# Patient Record
Sex: FEMALE | Race: WHITE | NOT HISPANIC OR LATINO | Employment: OTHER | URBAN - METROPOLITAN AREA
[De-identification: names, ages, dates, MRNs, and addresses within clinical notes are randomized per-mention and may not be internally consistent; named-entity substitution may affect disease eponyms.]

---

## 2020-04-14 ENCOUNTER — HOSPITAL ENCOUNTER (OUTPATIENT)
Dept: RADIOLOGY | Facility: MEDICAL CENTER | Age: 75
End: 2020-04-14
Payer: MEDICARE

## 2020-04-15 ENCOUNTER — OFFICE VISIT (OUTPATIENT)
Dept: PULMONOLOGY | Facility: HOSPICE | Age: 75
End: 2020-04-15
Payer: MEDICARE

## 2020-04-15 VITALS
HEIGHT: 65 IN | BODY MASS INDEX: 22.99 KG/M2 | WEIGHT: 138 LBS | TEMPERATURE: 97.9 F | HEART RATE: 87 BPM | DIASTOLIC BLOOD PRESSURE: 66 MMHG | OXYGEN SATURATION: 95 % | SYSTOLIC BLOOD PRESSURE: 124 MMHG | RESPIRATION RATE: 16 BRPM

## 2020-04-15 DIAGNOSIS — R60.9 EDEMA, UNSPECIFIED TYPE: ICD-10-CM

## 2020-04-15 DIAGNOSIS — R91.8 PULMONARY NODULES: ICD-10-CM

## 2020-04-15 PROCEDURE — 99204 OFFICE O/P NEW MOD 45 MIN: CPT | Performed by: INTERNAL MEDICINE

## 2020-04-15 RX ORDER — ESCITALOPRAM OXALATE 10 MG/1
TABLET ORAL
COMMUNITY
Start: 2020-01-28

## 2020-04-15 RX ORDER — LEVOTHYROXINE SODIUM 112 UG/1
88 TABLET ORAL
COMMUNITY
Start: 2020-04-02

## 2020-04-15 RX ORDER — LIOTHYRONINE SODIUM 5 UG/1
TABLET ORAL
COMMUNITY
Start: 2020-02-27 | End: 2022-10-14

## 2020-04-15 ASSESSMENT — ENCOUNTER SYMPTOMS
EYE REDNESS: 0
HEADACHES: 0
HEARTBURN: 0
SPUTUM PRODUCTION: 0
HEMOPTYSIS: 0
MYALGIAS: 0
PHOTOPHOBIA: 0
DOUBLE VISION: 0
FOCAL WEAKNESS: 0
SORE THROAT: 0
SINUS PAIN: 0
CHILLS: 0
DIZZINESS: 0
NECK PAIN: 0
EYE PAIN: 0
ORTHOPNEA: 0
WEAKNESS: 0
WEIGHT LOSS: 0
NAUSEA: 0
DEPRESSION: 0
BACK PAIN: 0
CONSTIPATION: 0
BLURRED VISION: 0
WHEEZING: 0
SHORTNESS OF BREATH: 0
PALPITATIONS: 0
DIARRHEA: 0
PND: 0
CLAUDICATION: 0
ABDOMINAL PAIN: 0
EYE DISCHARGE: 0
DIAPHORESIS: 0
COUGH: 0
SPEECH CHANGE: 0
TREMORS: 0
FEVER: 0
VOMITING: 0
STRIDOR: 0
FALLS: 0

## 2020-04-15 NOTE — PROGRESS NOTES
Chief Complaint   Patient presents with   • Establish Care     referral 2/19/2020 [facility]    • Results     [address] Thorax 3/12/2020, CXR 2/8/2020       HPI: This patient is a 74 y.o. female presenting for evaluation of pulmonary nodules.  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  The patient is a lifelong non-smoker.  She has no known occupational exposures.  No family history of autoimmune, atopic or cancer related illness.  She does have a  who is currently in remission from 9 smoking-related lung cancer.  She was seen in the emergency department in March for bilateral lower extremity edema and ultrasound was done which showed no evidence of DVT.  She also had a CAT scan of the chest done with contrast which demonstrated a 3 mm left lower lobe nodule and a 5 mm right middle lobe nodule.  Some atelectasis in the bilateral bases but no parenchymal lung disease, emphysema or lymphadenopathy.  The patient denies any pulmonary symptoms such as shortness of breath, dyspnea on exertion, chest pain, cough.  No infectious symptoms such as fever, chill, night sweats, weight loss.  She presents today mainly due to concern given her 's history and desire to follow-up.    Past Medical History:   Diagnosis Date   • Arthritis    • Cerebral palsy (HCC)    • Dental disorder     peridontal disease   • Migraine    • Pain     right hip,right shoulder,pain scale 7   • Psychiatric problem     anxiety and depression   • Snoring    • Transfusion history    • Unspecified disorder of thyroid     hypothyroid       Social History     Socioeconomic History   • Marital status:      Spouse name: Not on file   • Number of children: Not on file   • Years of education: Not on file   • Highest education level: Not on file   Occupational History   • Not on file   Social Needs   • Financial resource strain: Not on  file   • Food insecurity     Worry: Not on file     Inability: Not on file   • Transportation needs     Medical: Not on file     Non-medical: Not on file   Tobacco Use   • Smoking status: Never Smoker   • Smokeless tobacco: Never Used   Substance and Sexual Activity   • Alcohol use: No   • Drug use: No   • Sexual activity: Not on file   Lifestyle   • Physical activity     Days per week: Not on file     Minutes per session: Not on file   • Stress: Not on file   Relationships   • Social connections     Talks on phone: Not on file     Gets together: Not on file     Attends Yarsanism service: Not on file     Active member of club or organization: Not on file     Attends meetings of clubs or organizations: Not on file     Relationship status: Not on file   • Intimate partner violence     Fear of current or ex partner: Not on file     Emotionally abused: Not on file     Physically abused: Not on file     Forced sexual activity: Not on file   Other Topics Concern   • Not on file   Social History Narrative   • Not on file       Family History   Problem Relation Age of Onset   • Alzheimer's Disease Father        Current Outpatient Medications on File Prior to Visit   Medication Sig Dispense Refill   • escitalopram (LEXAPRO) 10 MG Tab      • liothyronine (CYTOMEL) 5 MCG Tab      • levothyroxine (SYNTHROID) 112 MCG Tab Take 112 mcg by mouth every day.     • Calcium-Magnesium-Vitamin D (GÓMEZ-MAG COMPLEX PO) Take 1,000 mg by mouth 3 times a day. Indications: 1000mg/500mg     • Cholecalciferol (VITAMIN D-3) 5000 units Tab Take  by mouth every day.     • Cobalamine Combinations (VITAMIN B12-FOLIC ACID PO) Take 1,000 mcg by mouth every day.     • thyroid (NP THYROID) 90 MG Tab Take 45 mg by mouth every day. Takes 1/2 tablet of 90 mg tablet daily. Confirmed with [facility] pharmacy     • hydrocodone-acetaminophen (NORCO) 7.5-325 MG per tablet Take 1 Tab by mouth every four hours as needed for Mild Pain.       No current  "facility-administered medications on file prior to visit.        Allergies: Bee venom and Shellfish allergy    ROS:   Review of Systems   Constitutional: Negative for chills, diaphoresis, fever, malaise/fatigue and weight loss.   HENT: Negative for congestion, ear discharge, ear pain, hearing loss, nosebleeds, sinus pain, sore throat and tinnitus.    Eyes: Negative for blurred vision, double vision, photophobia, pain, discharge and redness.   Respiratory: Negative for cough, hemoptysis, sputum production, shortness of breath, wheezing and stridor.    Cardiovascular: Positive for leg swelling. Negative for chest pain, palpitations, orthopnea, claudication and PND.   Gastrointestinal: Negative for abdominal pain, constipation, diarrhea, heartburn, nausea and vomiting.   Genitourinary: Negative for dysuria and urgency.   Musculoskeletal: Negative for back pain, falls, joint pain, myalgias and neck pain.   Skin: Negative for itching and rash.   Neurological: Negative for dizziness, tremors, speech change, focal weakness, weakness and headaches.   Endo/Heme/Allergies: Negative for environmental allergies.   Psychiatric/Behavioral: Negative for depression.       /66 (BP Location: Left arm, Patient Position: Sitting, BP Cuff Size: Adult)   Pulse 87   Temp 36.6 °C (97.9 °F) (Temporal)   Resp 16   Ht 1.651 m (5' 5\")   Wt 62.6 kg (138 lb)   SpO2 95%     Physical Exam:  Physical Exam  Constitutional:       General: She is not in acute distress.     Appearance: Normal appearance. She is well-developed and normal weight.   HENT:      Head: Normocephalic and atraumatic.      Right Ear: External ear normal.      Left Ear: External ear normal.      Nose: Nose normal. No congestion.      Mouth/Throat:      Mouth: Mucous membranes are moist.      Pharynx: Oropharynx is clear. No oropharyngeal exudate.   Eyes:      General: No scleral icterus.     Extraocular Movements: Extraocular movements intact.      " Conjunctiva/sclera: Conjunctivae normal.      Pupils: Pupils are equal, round, and reactive to light.   Neck:      Musculoskeletal: Neck supple.      Vascular: No JVD.      Trachea: No tracheal deviation.   Cardiovascular:      Rate and Rhythm: Normal rate and regular rhythm.      Heart sounds: Normal heart sounds. No murmur. No friction rub. No gallop.    Pulmonary:      Effort: Pulmonary effort is normal. No accessory muscle usage or respiratory distress.      Breath sounds: Normal breath sounds. No wheezing or rales.   Abdominal:      General: There is no distension.      Palpations: Abdomen is soft.      Tenderness: There is no abdominal tenderness.   Musculoskeletal: Normal range of motion.         General: Deformity present. No tenderness.      Right lower leg: Edema present.      Left lower leg: Edema present.      Comments: Trace b/l pedal edema, deformity of RUE   Lymphadenopathy:      Cervical: No cervical adenopathy.   Skin:     General: Skin is warm and dry.      Findings: No rash.      Nails: There is no clubbing.     Neurological:      Mental Status: She is alert and oriented to person, place, and time.      Cranial Nerves: No cranial nerve deficit.      Gait: Gait normal.   Psychiatric:         Mood and Affect: Mood normal.         Behavior: Behavior normal.         PFTs as reviewed by me personally:none    Imaging as reviewed by me personally:as per hPI    Assessment:  1. Edema, unspecified type  EC-ECHOCARDIOGRAM COMPLETE W/O CONT   2. Pulmonary nodules  CT-CHEST (THORAX) W/O       Plan:  1.  There is some chronicity to this per patient which has changed recently as her swelling was previously responsive to decreasing salt in her diet.  More recently it does not resolve as easily as previously and there is some tenderness associated with it.  I am recommending echocardiogram and follow-up with primary care.  2.  2 small, subcentimeter pulmonary nodules in low risk patient.  At this point optional  follow-up at 1 year would be recommended however given her concern with her 's non-smoking related, lung cancer we discussed a follow-up CT chest in 6 months and if no changes then in 1 year and essentially follow her nodules for a total of 2 years to ensure stability.  Patient is okay with this plan.  She will follow-up in 6 months with CT chest.  She will contact me sooner if any symptoms develop.  Return in about 6 months (around 10/15/2020) for ct, echo.

## 2020-10-01 ENCOUNTER — TELEPHONE (OUTPATIENT)
Dept: PULMONOLOGY | Facility: HOSPICE | Age: 75
End: 2020-10-01

## 2020-10-01 NOTE — TELEPHONE ENCOUNTER
Received a call from He at [facility]. They are requesting the order from Dr Bynum for CT of the chest.      Order was re-printed and faxed to them as requested.    Confirmation scanned in the patient's account.

## 2020-10-15 ENCOUNTER — OFFICE VISIT (OUTPATIENT)
Dept: PULMONOLOGY | Facility: HOSPICE | Age: 75
End: 2020-10-15
Payer: MEDICARE

## 2020-10-15 VITALS
BODY MASS INDEX: 20.97 KG/M2 | HEART RATE: 78 BPM | TEMPERATURE: 98.2 F | WEIGHT: 126 LBS | SYSTOLIC BLOOD PRESSURE: 114 MMHG | RESPIRATION RATE: 16 BRPM | DIASTOLIC BLOOD PRESSURE: 60 MMHG | OXYGEN SATURATION: 97 %

## 2020-10-15 DIAGNOSIS — R60.9 EDEMA, UNSPECIFIED TYPE: ICD-10-CM

## 2020-10-15 DIAGNOSIS — R91.8 PULMONARY NODULES: ICD-10-CM

## 2020-10-15 PROCEDURE — 99214 OFFICE O/P EST MOD 30 MIN: CPT | Performed by: INTERNAL MEDICINE

## 2020-10-15 ASSESSMENT — ENCOUNTER SYMPTOMS
HEADACHES: 0
FOCAL WEAKNESS: 0
SORE THROAT: 0
MYALGIAS: 0
ABDOMINAL PAIN: 0
CLAUDICATION: 0
SINUS PAIN: 0
FALLS: 0
BACK PAIN: 0
WEIGHT LOSS: 0
SHORTNESS OF BREATH: 0
DEPRESSION: 0
EYE PAIN: 0
EYE REDNESS: 0
CHILLS: 0
DOUBLE VISION: 0
TREMORS: 0
NAUSEA: 0
PHOTOPHOBIA: 0
DIAPHORESIS: 0
PND: 0
BLURRED VISION: 0
DIZZINESS: 0
SPUTUM PRODUCTION: 0
WEAKNESS: 0
PALPITATIONS: 0
WHEEZING: 0
STRIDOR: 0
SPEECH CHANGE: 0
HEMOPTYSIS: 0
DIARRHEA: 0
CONSTIPATION: 0
NECK PAIN: 0
HEARTBURN: 0
VOMITING: 0
COUGH: 0
EYE DISCHARGE: 0
FEVER: 0
ORTHOPNEA: 0

## 2020-10-15 NOTE — PROGRESS NOTES
Chief Complaint   Patient presents with   • Follow-Up     last seen 4/15/2020    • Results     Ct 10/2/2020, Echo 5/14/2020          HPI: This patient is a 75 y.o. female whom is followed in our clinic for pulmonary nodules last seen by me on 4/15/20.   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  The patient is a lifelong non-smoker.  She has no known occupational exposures.  No family history of autoimmune, atopic or cancer related illness.  She does have a  who is currently in remission from a non-smoking-related lung cancer.  She was seen in the emergency department in March for bilateral lower extremity edema and ultrasound was done which showed no evidence of DVT.  She also had a CAT scan of the chest done with contrast which demonstrated a 3 mm left lower lobe nodule and a 5 mm right middle lobe nodule.  Some atelectasis in the bilateral bases but no parenchymal lung disease, emphysema or lymphadenopathy.    We had plan to obtain echocardiogram and follow-up CT chest in 6 months.  She presents today for routine follow-up.  CT from October 2 shows stable 3 mm nodule and less conspicuous 5 mm nodule with no new nodules and no lymphadenopathy.  No parenchymal lung disease.  Echocardiogram from May 14 showed normal LV and RV function, no significant valvular pathology or pulmonary hypertension.  Patient has no new pulmonary complaints.  She continues to have unilateral left lower extremity edema but is trying to ambulate more to promote increased blood flow.  She sees her primary care later today as it does cause her some discomfort.    Past Medical History:   Diagnosis Date   • Arthritis    • Cerebral palsy (HCC)    • Dental disorder     peridontal disease   • Migraine    • Pain     right hip,right shoulder,pain scale 7   • Psychiatric problem     anxiety and depression   • Snoring    • Transfusion history    •  Unspecified disorder of thyroid     hypothyroid       Social History     Socioeconomic History   • Marital status:      Spouse name: Not on file   • Number of children: Not on file   • Years of education: Not on file   • Highest education level: Not on file   Occupational History   • Not on file   Social Needs   • Financial resource strain: Not on file   • Food insecurity     Worry: Not on file     Inability: Not on file   • Transportation needs     Medical: Not on file     Non-medical: Not on file   Tobacco Use   • Smoking status: Never Smoker   • Smokeless tobacco: Never Used   Substance and Sexual Activity   • Alcohol use: No   • Drug use: No   • Sexual activity: Not on file   Lifestyle   • Physical activity     Days per week: Not on file     Minutes per session: Not on file   • Stress: Not on file   Relationships   • Social connections     Talks on phone: Not on file     Gets together: Not on file     Attends [employer] service: Not on file     Active member of club or organization: Not on file     Attends meetings of clubs or organizations: Not on file     Relationship status: Not on file   • Intimate partner violence     Fear of current or ex partner: Not on file     Emotionally abused: Not on file     Physically abused: Not on file     Forced sexual activity: Not on file   Other Topics Concern   • Not on file   Social History Narrative   • Not on file       Family History   Problem Relation Age of Onset   • Alzheimer's Disease Father        Current Outpatient Medications on File Prior to Visit   Medication Sig Dispense Refill   • escitalopram (LEXAPRO) 10 MG Tab      • liothyronine (CYTOMEL) 5 MCG Tab      • levothyroxine (SYNTHROID) 112 MCG Tab Take 112 mcg by mouth every day.     • Calcium-Magnesium-Vitamin D (GÓMEZ-MAG COMPLEX PO) Take 1,000 mg by mouth 3 times a day. Indications: 1000mg/500mg     • Cholecalciferol (VITAMIN D-3) 5000 units Tab Take  by mouth every day.     • Cobalamine Combinations  (VITAMIN B12-FOLIC ACID PO) Take 1,000 mcg by mouth every day.     • hydrocodone-acetaminophen (NORCO) 7.5-325 MG per tablet Take 1 Tab by mouth every four hours as needed for Mild Pain.     • thyroid (NP THYROID) 90 MG Tab Take 45 mg by mouth every day. Takes 1/2 tablet of 90 mg tablet daily. Confirmed with [facility] pharmacy       No current facility-administered medications on file prior to visit.        Bee venom and Shellfish allergy      ROS:   Review of Systems   Constitutional: Negative for chills, diaphoresis, fever, malaise/fatigue and weight loss.   HENT: Negative for congestion, ear discharge, ear pain, hearing loss, nosebleeds, sinus pain, sore throat and tinnitus.    Eyes: Negative for blurred vision, double vision, photophobia, pain, discharge and redness.   Respiratory: Negative for cough, hemoptysis, sputum production, shortness of breath, wheezing and stridor.    Cardiovascular: Positive for leg swelling. Negative for chest pain, palpitations, orthopnea, claudication and PND.   Gastrointestinal: Negative for abdominal pain, constipation, diarrhea, heartburn, nausea and vomiting.   Genitourinary: Negative for dysuria and urgency.   Musculoskeletal: Negative for back pain, falls, joint pain, myalgias and neck pain.   Skin: Negative for itching and rash.   Neurological: Negative for dizziness, tremors, speech change, focal weakness, weakness and headaches.   Endo/Heme/Allergies: Negative for environmental allergies.   Psychiatric/Behavioral: Negative for depression.       /60 (BP Location: Left arm, Patient Position: Sitting, BP Cuff Size: Large adult)   Pulse 78   Temp 36.8 °C (98.2 °F) (Temporal)   Resp 16   Wt 57.2 kg (126 lb)   SpO2 97%   Physical Exam  Constitutional:       General: She is not in acute distress.     Appearance: Normal appearance. She is well-developed and normal weight.   HENT:      Head: Normocephalic and atraumatic.      Right Ear: External ear normal.      Left Ear:  External ear normal.      Nose: Nose normal. No congestion.      Mouth/Throat:      Mouth: Mucous membranes are moist.      Pharynx: Oropharynx is clear. No oropharyngeal exudate.   Eyes:      General: No scleral icterus.     Extraocular Movements: Extraocular movements intact.      Conjunctiva/sclera: Conjunctivae normal.      Pupils: Pupils are equal, round, and reactive to light.   Neck:      Musculoskeletal: Normal range of motion and neck supple.      Vascular: No JVD.      Trachea: No tracheal deviation.   Cardiovascular:      Rate and Rhythm: Normal rate and regular rhythm.      Heart sounds: Normal heart sounds. No murmur. No friction rub. No gallop.    Pulmonary:      Effort: Pulmonary effort is normal. No accessory muscle usage or respiratory distress.      Breath sounds: Normal breath sounds. No wheezing or rales.   Abdominal:      General: There is no distension.      Palpations: Abdomen is soft.      Tenderness: There is no abdominal tenderness.   Musculoskeletal: Normal range of motion.         General: Deformity present. No tenderness.      Right lower leg: No edema.      Left lower leg: Edema present.      Comments: Mild deformity of RUE   Lymphadenopathy:      Cervical: No cervical adenopathy.   Skin:     General: Skin is warm and dry.      Findings: No rash.      Nails: There is no clubbing.     Neurological:      Mental Status: She is alert and oriented to person, place, and time.      Cranial Nerves: No cranial nerve deficit.      Gait: Gait normal.   Psychiatric:         Mood and Affect: Mood normal.         Behavior: Behavior normal.         PFTs as reviewed by me personally:none    Imaging as reviewed by me personally:  As per hPI    Assessment:  1. Pulmonary nodules  CT-CHEST (THORAX) W/O   2. Edema, unspecified type         Plan:  1.  3 mm nodule is not concerning.  5 mm nodule less conspicuous and likely benign.  Patient is low risk, lifelong non-smoker.  Given nodules greater than 4 mm but  less than 6 mm we opted to repeat CT chest in 1 year or sooner if symptoms develop.  2.  This is chronic, mild and I suspect related to venous insufficiency.  She has follow-up with PCP regarding this today.  I encouraged her to discuss possible compression stocking.  Return in about 1 year (around 10/15/2021) for CT chest.

## 2021-09-24 ENCOUNTER — HOSPITAL ENCOUNTER (OUTPATIENT)
Dept: RADIOLOGY | Facility: MEDICAL CENTER | Age: 76
End: 2021-09-24
Attending: INTERNAL MEDICINE
Payer: MEDICARE

## 2021-09-24 DIAGNOSIS — R91.8 PULMONARY NODULES: ICD-10-CM

## 2021-09-24 PROCEDURE — 71250 CT THORAX DX C-: CPT | Mod: MG

## 2021-10-15 ENCOUNTER — OFFICE VISIT (OUTPATIENT)
Dept: SLEEP MEDICINE | Facility: MEDICAL CENTER | Age: 76
End: 2021-10-15
Payer: MEDICARE

## 2021-10-15 VITALS
DIASTOLIC BLOOD PRESSURE: 64 MMHG | WEIGHT: 140 LBS | HEART RATE: 84 BPM | RESPIRATION RATE: 16 BRPM | TEMPERATURE: 98.4 F | BODY MASS INDEX: 23.32 KG/M2 | HEIGHT: 65 IN | SYSTOLIC BLOOD PRESSURE: 130 MMHG | OXYGEN SATURATION: 96 %

## 2021-10-15 DIAGNOSIS — R91.8 LUNG NODULES: ICD-10-CM

## 2021-10-15 PROCEDURE — 99213 OFFICE O/P EST LOW 20 MIN: CPT | Performed by: INTERNAL MEDICINE

## 2021-10-15 ASSESSMENT — ENCOUNTER SYMPTOMS
SPEECH CHANGE: 0
HEADACHES: 0
DOUBLE VISION: 0
DIAPHORESIS: 0
HEARTBURN: 0
STRIDOR: 0
PALPITATIONS: 0
WEIGHT LOSS: 0
DEPRESSION: 0
EYE REDNESS: 0
NECK PAIN: 0
SHORTNESS OF BREATH: 0
CONSTIPATION: 0
DIZZINESS: 0
BLURRED VISION: 0
ABDOMINAL PAIN: 0
FOCAL WEAKNESS: 0
FEVER: 0
SORE THROAT: 0
SINUS PAIN: 0
PHOTOPHOBIA: 0
CHILLS: 0
ORTHOPNEA: 0
FALLS: 0
WHEEZING: 0
CLAUDICATION: 0
MYALGIAS: 0
COUGH: 0
EYE DISCHARGE: 0
EYE PAIN: 0
WEAKNESS: 0
SPUTUM PRODUCTION: 0
DIARRHEA: 0
HEMOPTYSIS: 0
NAUSEA: 0
PND: 0
TREMORS: 0
VOMITING: 0
BACK PAIN: 0

## 2021-10-15 NOTE — PROGRESS NOTES
Chief Complaint   Patient presents with   • Follow-Up     last seen 10/15/2020   • Results     CT Chest Thorax 9/24/21          HPI: This patient is a 76 y.o. female whom is followed in our clinic for pulmonary nodules last seen by me on 10/15/20.  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  The patient is a lifelong non-smoker.  She has no known occupational exposures.  No family history of autoimmune, atopic or cancer related illness.  She does have a  who is currently in remission from a non-smoking-related lung cancer.  She was seen in the emergency department in March 2020 for bilateral lower extremity edema and ultrasound was done which showed no evidence of DVT.  She also had a CAT scan of the chest done with contrast which demonstrated a 3 mm left lower lobe nodule and a 5 mm right middle lobe nodule.  Some atelectasis in the bilateral bases but no parenchymal lung disease, emphysema or lymphadenopathy.   Echo showed normal LV and RV fx in May 2020. Surveillance CT from October last year showed no change in pulmonary nodules and pt has remained asx form a pulmonary standpoint. She presents today for routine f/u with CT from 9/24 showing stable nodules and two new punctate RUL pulmonary nodules. One per my read may be in an airway? Pt remains w/o pulmonary sxs. No cough, no fever/chills/nt sweats/wt loss.    Past Medical History:   Diagnosis Date   • Arthritis    • Cerebral palsy (HCC)    • Dental disorder     peridontal disease   • Migraine    • Pain     right hip,right shoulder,pain scale 7   • Psychiatric problem     anxiety and depression   • Snoring    • Transfusion history    • Unspecified disorder of thyroid     hypothyroid       Social History     Socioeconomic History   • Marital status:      Spouse name: Not on file   • Number of children: Not on file   • Years of education: Not on file   •  Highest education level: Not on file   Occupational History   • Not on file   Tobacco Use   • Smoking status: Never Smoker   • Smokeless tobacco: Never Used   Vaping Use   • Vaping Use: Never used   Substance and Sexual Activity   • Alcohol use: No   • Drug use: No   • Sexual activity: Not on file   Other Topics Concern   • Not on file   Social History Narrative   • Not on file     Social Determinants of Health     Financial Resource Strain:    • Difficulty of Paying Living Expenses:    Food Insecurity:    • Worried About Running Out of Food in the Last Year:    • Ran Out of Food in the Last Year:    Transportation Needs:    • Lack of Transportation (Medical):    • Lack of Transportation (Non-Medical):    Physical Activity:    • Days of Exercise per Week:    • Minutes of Exercise per Session:    Stress:    • Feeling of Stress :    Social Connections:    • Frequency of Communication with Friends and Family:    • Frequency of Social Gatherings with Friends and Family:    • Attends [employer] Services:    • Active Member of Clubs or Organizations:    • Attends Club or Organization Meetings:    • Marital Status:    Intimate Partner Violence:    • Fear of Current or Ex-Partner:    • Emotionally Abused:    • Physically Abused:    • Sexually Abused:        Family History   Problem Relation Age of Onset   • Alzheimer's Disease Father        Current Outpatient Medications on File Prior to Visit   Medication Sig Dispense Refill   • Multiple Vitamins-Minerals (ZINC PO) Take  by mouth.     • escitalopram (LEXAPRO) 10 MG Tab      • liothyronine (CYTOMEL) 5 MCG Tab      • levothyroxine (SYNTHROID) 112 MCG Tab Take 88 mcg by mouth every day.     • thyroid (NP THYROID) 90 MG Tab Take 45 mg by mouth every day. Takes 1/2 tablet of 90 mg tablet daily. Confirmed with [facility] pharmacy     • Calcium-Magnesium-Vitamin D (GÓMEZ-MAG COMPLEX PO) Take 1,000 mg by mouth 3 times a day. Indications: 1000mg/500mg     • Cholecalciferol (VITAMIN D-3) 5000  "units Tab Take  by mouth every day.     • Cobalamine Combinations (VITAMIN B12-FOLIC ACID PO) Take 1,000 mcg by mouth every day.     • hydrocodone-acetaminophen (NORCO) 7.5-325 MG per tablet Take 1 Tab by mouth every four hours as needed for Mild Pain.       No current facility-administered medications on file prior to visit.       Bee venom and Shellfish allergy      ROS:   Review of Systems   Constitutional: Negative for chills, diaphoresis, fever, malaise/fatigue and weight loss.   HENT: Negative for congestion, ear discharge, ear pain, hearing loss, nosebleeds, sinus pain, sore throat and tinnitus.    Eyes: Negative for blurred vision, double vision, photophobia, pain, discharge and redness.   Respiratory: Negative for cough, hemoptysis, sputum production, shortness of breath, wheezing and stridor.    Cardiovascular: Negative for chest pain, palpitations, orthopnea, claudication, leg swelling and PND.   Gastrointestinal: Negative for abdominal pain, constipation, diarrhea, heartburn, nausea and vomiting.   Genitourinary: Negative for dysuria and urgency.   Musculoskeletal: Negative for back pain, falls, joint pain, myalgias and neck pain.   Skin: Negative for itching and rash.   Neurological: Negative for dizziness, tremors, speech change, focal weakness, weakness and headaches.   Endo/Heme/Allergies: Negative for environmental allergies.   Psychiatric/Behavioral: Negative for depression.       /64 (BP Location: Left arm, Patient Position: Sitting, BP Cuff Size: Adult)   Pulse 84   Temp 36.9 °C (98.4 °F) (Temporal)   Resp 16   Ht 1.651 m (5' 5\")   Wt 63.5 kg (140 lb)   SpO2 96%   Physical Exam  Constitutional:       General: She is not in acute distress.     Appearance: Normal appearance. She is well-developed and normal weight.   HENT:      Head: Normocephalic and atraumatic.      Right Ear: External ear normal.      Left Ear: External ear normal.      Nose: Nose normal. No congestion.      " Mouth/Throat:      Mouth: Mucous membranes are moist.      Pharynx: Oropharynx is clear. No oropharyngeal exudate.   Eyes:      General: No scleral icterus.     Extraocular Movements: Extraocular movements intact.      Conjunctiva/sclera: Conjunctivae normal.      Pupils: Pupils are equal, round, and reactive to light.   Neck:      Vascular: No JVD.      Trachea: No tracheal deviation.   Cardiovascular:      Rate and Rhythm: Normal rate and regular rhythm.      Heart sounds: Normal heart sounds. No murmur heard.   No friction rub. No gallop.    Pulmonary:      Effort: Pulmonary effort is normal. No accessory muscle usage or respiratory distress.      Breath sounds: Normal breath sounds. No wheezing or rales.   Abdominal:      General: There is no distension.      Palpations: Abdomen is soft.      Tenderness: There is no abdominal tenderness.   Musculoskeletal:         General: No tenderness or deformity. Normal range of motion.      Cervical back: Normal range of motion and neck supple.      Right lower leg: No edema.      Left lower leg: No edema.   Lymphadenopathy:      Cervical: No cervical adenopathy.   Skin:     General: Skin is warm and dry.      Findings: No rash.      Nails: There is no clubbing.   Neurological:      Mental Status: She is alert and oriented to person, place, and time.      Cranial Nerves: No cranial nerve deficit.      Gait: Gait normal.   Psychiatric:         Mood and Affect: Mood normal.         Behavior: Behavior normal.         PFTs as reviewed by me personally: as per hPI    Imaging as reviewed by me personally:  As per hPI    Assessment:  1. Lung nodules  CT-CHEST (THORAX) W/O       Plan:  The largest of these are stable now since 3/2020. Pt is low risk but now with several new nodules. Repeat CT in one year. If no change and no sxs, can likely stop surveillance imaging.   Return in about 1 year (around 10/15/2022) for ct chest.

## 2022-10-14 ENCOUNTER — HOSPITAL ENCOUNTER (OUTPATIENT)
Dept: RADIOLOGY | Facility: MEDICAL CENTER | Age: 77
End: 2022-10-14

## 2022-10-14 ENCOUNTER — OFFICE VISIT (OUTPATIENT)
Dept: SLEEP MEDICINE | Facility: MEDICAL CENTER | Age: 77
End: 2022-10-14
Payer: MEDICARE

## 2022-10-14 VITALS
DIASTOLIC BLOOD PRESSURE: 60 MMHG | RESPIRATION RATE: 16 BRPM | OXYGEN SATURATION: 97 % | HEIGHT: 65 IN | HEART RATE: 64 BPM | BODY MASS INDEX: 23.49 KG/M2 | WEIGHT: 141 LBS | SYSTOLIC BLOOD PRESSURE: 122 MMHG

## 2022-10-14 DIAGNOSIS — R91.8 LUNG NODULES: ICD-10-CM

## 2022-10-14 DIAGNOSIS — Z86.16 HISTORY OF COVID-19: ICD-10-CM

## 2022-10-14 PROCEDURE — 99213 OFFICE O/P EST LOW 20 MIN: CPT | Performed by: INTERNAL MEDICINE

## 2022-10-14 ASSESSMENT — ENCOUNTER SYMPTOMS
CONSTIPATION: 0
FALLS: 0
WEIGHT LOSS: 0
NAUSEA: 0
EYE DISCHARGE: 0
HEADACHES: 0
DIZZINESS: 0
SPEECH CHANGE: 0
SINUS PAIN: 0
WHEEZING: 0
SHORTNESS OF BREATH: 0
BLURRED VISION: 0
MYALGIAS: 0
EYE PAIN: 0
SORE THROAT: 0
DEPRESSION: 0
EYE REDNESS: 0
CHILLS: 0
NECK PAIN: 0
DIARRHEA: 0
DOUBLE VISION: 0
FEVER: 0
ORTHOPNEA: 0
CLAUDICATION: 0
VOMITING: 0
ABDOMINAL PAIN: 0
HEMOPTYSIS: 0
FOCAL WEAKNESS: 0
WEAKNESS: 0
PHOTOPHOBIA: 0
TREMORS: 0
STRIDOR: 0
PALPITATIONS: 0
DIAPHORESIS: 0
PND: 0
BACK PAIN: 0
HEARTBURN: 0
COUGH: 0
SPUTUM PRODUCTION: 0

## 2022-10-14 NOTE — PROGRESS NOTES
Chief Complaint   Patient presents with    Follow-Up     Last seen 10/15/21     Results     CT Chest Thorax 10/11/22          HPI: This patient is a 77 y.o. female whom is followed in our clinic for pulmonary nodules last seen by me on 10/15/21. 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  The patient is a lifelong non-smoker.  She has no known occupational exposures.  No family history of autoimmune, atopic or cancer related illness.  She does have a  who is currently in remission from a non-smoking-related lung cancer.  She was seen in the emergency department in March 2020 for bilateral lower extremity edema and ultrasound was done which showed no evidence of DVT.  She also had a CAT scan of the chest done with contrast which demonstrated a 3 mm left lower lobe nodule and a 5 mm right middle lobe nodule.  Some atelectasis in the bilateral bases but no parenchymal lung disease, emphysema or lymphadenopathy.   Echo showed normal LV and RV fx in May 2020. Surveillance CT from October 2020 and again September 2021 showed stability of largest nodules however the CT from 9/2021 showed some new, punctate RUL nodules. We planned to repeat one additional CT which was done at [facility] on 10/11 and the punctate nodules had resolved, largest RUL nodule slightly decreased in size. No new nodules. Pt did have covid in February but has since recovered. No respiratory complaints today.    Past Medical History:   Diagnosis Date    Arthritis     Cerebral palsy (HCC)     Dental disorder     peridontal disease    Migraine     Pain     right hip,right shoulder,pain scale 7    Psychiatric problem     anxiety and depression    Snoring     Transfusion history     Unspecified disorder of thyroid     hypothyroid       Social History     Socioeconomic History    Marital status:      Spouse name: Not on file    Number of children:  Not on file    Years of education: Not on file    Highest education level: Not on file   Occupational History    Not on file   Tobacco Use    Smoking status: Never    Smokeless tobacco: Never   Vaping Use    Vaping Use: Never used   Substance and Sexual Activity    Alcohol use: No    Drug use: No    Sexual activity: Not on file   Other Topics Concern    Not on file   Social History Narrative    Not on file     Social Determinants of Health     Financial Resource Strain: Not on file   Food Insecurity: Not on file   Transportation Needs: Not on file   Physical Activity: Not on file   Stress: Not on file   Social Connections: Not on file   Intimate Partner Violence: Not on file   Housing Stability: Not on file       Family History   Problem Relation Age of Onset    Alzheimer's Disease Father        Current Outpatient Medications on File Prior to Visit   Medication Sig Dispense Refill    Multiple Vitamins-Minerals (ZINC PO) Take  by mouth.      escitalopram (LEXAPRO) 10 MG Tab       levothyroxine (SYNTHROID) 112 MCG Tab Take 88 mcg by mouth every day.      Calcium-Magnesium-Vitamin D (GÓMEZ-MAG COMPLEX PO) Take 1,000 mg by mouth 3 times a day. Indications: 1000mg/500mg      Cholecalciferol (VITAMIN D-3) 5000 units Tab Take  by mouth every day.      Cobalamine Combinations (VITAMIN B12-FOLIC ACID PO) Take 1,000 mcg by mouth every day.      HYDROcodone-acetaminophen (NORCO) 7.5-325 MG tab Take 1 Tab by mouth every four hours as needed for Mild Pain.      liothyronine (CYTOMEL) 5 MCG Tab  (Patient not taking: Reported on 10/14/2022)      thyroid (ARMOUR THYROID) 90 MG Tab Take 45 mg by mouth every day. Takes 1/2 tablet of 90 mg tablet daily. Confirmed with [facility] pharmacy (Patient not taking: Reported on 10/14/2022)       No current facility-administered medications on file prior to visit.       Bee venom and Shellfish allergy      ROS:   Review of Systems   Constitutional:  Negative for chills, diaphoresis, fever,  "malaise/fatigue and weight loss.   HENT:  Negative for congestion, ear discharge, ear pain, hearing loss, nosebleeds, sinus pain, sore throat and tinnitus.    Eyes:  Negative for blurred vision, double vision, photophobia, pain, discharge and redness.   Respiratory:  Negative for cough, hemoptysis, sputum production, shortness of breath, wheezing and stridor.    Cardiovascular:  Negative for chest pain, palpitations, orthopnea, claudication, leg swelling and PND.   Gastrointestinal:  Negative for abdominal pain, constipation, diarrhea, heartburn, nausea and vomiting.   Genitourinary:  Negative for dysuria and urgency.   Musculoskeletal:  Negative for back pain, falls, joint pain, myalgias and neck pain.   Skin:  Negative for itching and rash.   Neurological:  Negative for dizziness, tremors, speech change, focal weakness, weakness and headaches.   Endo/Heme/Allergies:  Negative for environmental allergies.   Psychiatric/Behavioral:  Negative for depression.      /60 (BP Location: Left arm, Patient Position: Sitting, BP Cuff Size: Adult)   Pulse 64   Resp 16   Ht 1.651 m (5' 5\")   Wt 64 kg (141 lb)   SpO2 97%   Physical Exam  Constitutional:       General: She is not in acute distress.     Appearance: Normal appearance. She is well-developed and normal weight.   HENT:      Head: Normocephalic and atraumatic.      Right Ear: External ear normal.      Left Ear: External ear normal.      Nose: Nose normal. No congestion.      Mouth/Throat:      Mouth: Mucous membranes are moist.      Pharynx: Oropharynx is clear. No oropharyngeal exudate.   Eyes:      General: No scleral icterus.     Extraocular Movements: Extraocular movements intact.      Conjunctiva/sclera: Conjunctivae normal.      Pupils: Pupils are equal, round, and reactive to light.   Neck:      Vascular: No JVD.      Trachea: No tracheal deviation.   Cardiovascular:      Rate and Rhythm: Normal rate and regular rhythm.      Heart sounds: Normal heart " sounds. No murmur heard.    No friction rub. No gallop.   Pulmonary:      Effort: Pulmonary effort is normal. No accessory muscle usage or respiratory distress.      Breath sounds: Normal breath sounds. No wheezing or rales.   Abdominal:      General: There is no distension.      Palpations: Abdomen is soft.      Tenderness: There is no abdominal tenderness.   Musculoskeletal:         General: No tenderness or deformity. Normal range of motion.      Cervical back: Normal range of motion and neck supple.      Right lower leg: No edema.      Left lower leg: No edema.   Lymphadenopathy:      Cervical: No cervical adenopathy.   Skin:     General: Skin is warm and dry.      Findings: No rash.      Nails: There is no clubbing.   Neurological:      Mental Status: She is alert and oriented to person, place, and time.      Cranial Nerves: No cranial nerve deficit.      Gait: Gait normal.   Psychiatric:         Behavior: Behavior normal.       PFTs as reviewed by me personally: NA    Imaging as reviewed by me personally:  as per hPI    Assessment:  1. Lung nodules        2. History of COVID-19            Plan:  Smaller newer nodules resolved on most recent CT and largest nodule is stable to slightly decreased in size for over 2 years now. Okay to stop surveillance imaging in low risk patient  Pt has since recovered radiographically and clinically. Continue best supportive care  Return if symptoms worsen or fail to improve.

## 2024-09-02 ENCOUNTER — APPOINTMENT (OUTPATIENT)
Dept: RADIOLOGY | Facility: MEDICAL CENTER | Age: 79
DRG: 084 | End: 2024-09-02
Attending: EMERGENCY MEDICINE
Payer: MEDICARE

## 2024-09-02 ENCOUNTER — HOSPITAL ENCOUNTER (OUTPATIENT)
Dept: RADIOLOGY | Facility: MEDICAL CENTER | Age: 79
End: 2024-09-02

## 2024-09-02 ENCOUNTER — HOSPITAL ENCOUNTER (INPATIENT)
Facility: MEDICAL CENTER | Age: 79
LOS: 2 days | DRG: 084 | End: 2024-09-04
Attending: EMERGENCY MEDICINE | Admitting: STUDENT IN AN ORGANIZED HEALTH CARE EDUCATION/TRAINING PROGRAM
Payer: MEDICARE

## 2024-09-02 DIAGNOSIS — G80.9 CEREBRAL PALSY, UNSPECIFIED TYPE (HCC): ICD-10-CM

## 2024-09-02 DIAGNOSIS — E03.9 ACQUIRED HYPOTHYROIDISM: ICD-10-CM

## 2024-09-02 DIAGNOSIS — Z91.89 AT RISK FOR MALNUTRITION: ICD-10-CM

## 2024-09-02 DIAGNOSIS — I60.9 SAH (SUBARACHNOID HEMORRHAGE) (HCC): ICD-10-CM

## 2024-09-02 DIAGNOSIS — I60.9 SUBARACHNOID HEMORRHAGE (HCC): ICD-10-CM

## 2024-09-02 DIAGNOSIS — S09.90XA CLOSED HEAD INJURY, INITIAL ENCOUNTER: ICD-10-CM

## 2024-09-02 DIAGNOSIS — R55 SYNCOPE AND COLLAPSE: ICD-10-CM

## 2024-09-02 PROBLEM — Z71.89 ACP (ADVANCE CARE PLANNING): Status: ACTIVE | Noted: 2024-09-02

## 2024-09-02 LAB
APPEARANCE UR: CLEAR
BILIRUB UR QL STRIP.AUTO: NEGATIVE
CFT BLD TEG: 5.6 MIN (ref 4.6–9.1)
CFT P HPASE BLD TEG: 4.7 MIN (ref 4.3–8.3)
CLOT ANGLE BLD TEG: 76 DEGREES (ref 63–78)
COLOR UR: YELLOW
CT.EXTRINSIC BLD ROTEM: 0.9 MIN (ref 0.8–2.1)
EKG IMPRESSION: NORMAL
GLUCOSE UR STRIP.AUTO-MCNC: NEGATIVE MG/DL
KETONES UR STRIP.AUTO-MCNC: NEGATIVE MG/DL
LEUKOCYTE ESTERASE UR QL STRIP.AUTO: NEGATIVE
MCF BLD TEG: 65.1 MM (ref 52–69)
MCF.PLATELET INHIB BLD ROTEM: 24.5 MM (ref 15–32)
MICRO URNS: NORMAL
NITRITE UR QL STRIP.AUTO: NEGATIVE
PA AA BLD-ACNC: 0 % (ref 0–11)
PA ADP BLD-ACNC: 12.6 % (ref 0–17)
PH UR STRIP.AUTO: 6.5 [PH] (ref 5–8)
PROT UR QL STRIP: NEGATIVE MG/DL
RBC UR QL AUTO: NEGATIVE
SP GR UR STRIP.AUTO: 1.02
TEG ALGORITHM TGALG: NORMAL
UROBILINOGEN UR STRIP.AUTO-MCNC: 0.2 MG/DL

## 2024-09-02 PROCEDURE — 73030 X-RAY EXAM OF SHOULDER: CPT | Mod: RT

## 2024-09-02 PROCEDURE — 99223 1ST HOSP IP/OBS HIGH 75: CPT | Mod: 25 | Performed by: STUDENT IN AN ORGANIZED HEALTH CARE EDUCATION/TRAINING PROGRAM

## 2024-09-02 PROCEDURE — 93005 ELECTROCARDIOGRAM TRACING: CPT | Performed by: STUDENT IN AN ORGANIZED HEALTH CARE EDUCATION/TRAINING PROGRAM

## 2024-09-02 PROCEDURE — 700105 HCHG RX REV CODE 258: Performed by: STUDENT IN AN ORGANIZED HEALTH CARE EDUCATION/TRAINING PROGRAM

## 2024-09-02 PROCEDURE — 99222 1ST HOSP IP/OBS MODERATE 55: CPT | Performed by: STUDENT IN AN ORGANIZED HEALTH CARE EDUCATION/TRAINING PROGRAM

## 2024-09-02 PROCEDURE — 700102 HCHG RX REV CODE 250 W/ 637 OVERRIDE(OP): Performed by: STUDENT IN AN ORGANIZED HEALTH CARE EDUCATION/TRAINING PROGRAM

## 2024-09-02 PROCEDURE — 85384 FIBRINOGEN ACTIVITY: CPT | Mod: 91

## 2024-09-02 PROCEDURE — 36415 COLL VENOUS BLD VENIPUNCTURE: CPT

## 2024-09-02 PROCEDURE — 85347 COAGULATION TIME ACTIVATED: CPT

## 2024-09-02 PROCEDURE — 99497 ADVNCD CARE PLAN 30 MIN: CPT | Mod: 25 | Performed by: STUDENT IN AN ORGANIZED HEALTH CARE EDUCATION/TRAINING PROGRAM

## 2024-09-02 PROCEDURE — 770020 HCHG ROOM/CARE - TELE (206)

## 2024-09-02 PROCEDURE — 305948 HCHG GREEN TRAUMA ACT PRE-NOTIFY NO CC

## 2024-09-02 PROCEDURE — 81003 URINALYSIS AUTO W/O SCOPE: CPT

## 2024-09-02 PROCEDURE — A9270 NON-COVERED ITEM OR SERVICE: HCPCS | Performed by: STUDENT IN AN ORGANIZED HEALTH CARE EDUCATION/TRAINING PROGRAM

## 2024-09-02 PROCEDURE — 72125 CT NECK SPINE W/O DYE: CPT

## 2024-09-02 PROCEDURE — 71045 X-RAY EXAM CHEST 1 VIEW: CPT

## 2024-09-02 PROCEDURE — 99285 EMERGENCY DEPT VISIT HI MDM: CPT

## 2024-09-02 PROCEDURE — 85576 BLOOD PLATELET AGGREGATION: CPT | Mod: 91

## 2024-09-02 PROCEDURE — G0316 PR PROLONGED IP/OBS E&M EA 15 MIN: HCPCS | Performed by: STUDENT IN AN ORGANIZED HEALTH CARE EDUCATION/TRAINING PROGRAM

## 2024-09-02 RX ORDER — CHLORAL HYDRATE 500 MG
1000 CAPSULE ORAL
COMMUNITY

## 2024-09-02 RX ORDER — OXYBUTYNIN CHLORIDE 10 MG/1
10 TABLET, EXTENDED RELEASE ORAL
COMMUNITY

## 2024-09-02 RX ORDER — LEVOTHYROXINE SODIUM 88 UG/1
88 TABLET ORAL
Status: DISCONTINUED | OUTPATIENT
Start: 2024-09-03 | End: 2024-09-04 | Stop reason: HOSPADM

## 2024-09-02 RX ORDER — LIOTHYRONINE SODIUM 5 UG/1
2.5 TABLET ORAL EVERY MORNING
COMMUNITY
Start: 2024-03-25

## 2024-09-02 RX ORDER — SODIUM CHLORIDE 9 MG/ML
INJECTION, SOLUTION INTRAVENOUS CONTINUOUS
Status: DISCONTINUED | OUTPATIENT
Start: 2024-09-02 | End: 2024-09-03

## 2024-09-02 RX ORDER — LIOTHYRONINE SODIUM 5 UG/1
2.5 TABLET ORAL EVERY MORNING
Status: DISCONTINUED | OUTPATIENT
Start: 2024-09-03 | End: 2024-09-04 | Stop reason: HOSPADM

## 2024-09-02 RX ORDER — ACETAMINOPHEN 325 MG/1
650 TABLET ORAL EVERY 6 HOURS PRN
Status: DISCONTINUED | OUTPATIENT
Start: 2024-09-02 | End: 2024-09-04 | Stop reason: HOSPADM

## 2024-09-02 RX ADMIN — ACETAMINOPHEN 650 MG: 325 TABLET ORAL at 20:45

## 2024-09-02 RX ADMIN — SODIUM CHLORIDE: 9 INJECTION, SOLUTION INTRAVENOUS at 18:28

## 2024-09-02 ASSESSMENT — ENCOUNTER SYMPTOMS
RESPIRATORY NEGATIVE: 1
EYES NEGATIVE: 1
CONSTITUTIONAL NEGATIVE: 1
LOSS OF CONSCIOUSNESS: 1
CARDIOVASCULAR NEGATIVE: 1
PSYCHIATRIC NEGATIVE: 1
MUSCULOSKELETAL NEGATIVE: 1
GASTROINTESTINAL NEGATIVE: 1

## 2024-09-02 NOTE — H&P
Consult time 1622  Eval time 1626  CHIEF COMPLAINT: Fall.     HISTORY OF PRESENT ILLNESS: The patient is a 78 year-old White woman who was injured in a fall. The patient experienced a possible syncopal event and ground-level fall. She had an unknown loss of consciousness. The patient denies any chronic anticoagulation or antiplatelet medications. She complains of pain right shoulder.    TRIAGE CATEGORY: The patient was triaged as a Trauma Green Transfer Activation. The patient was initially evaluated at [facility] in [place], [place] where CT imaging demonstrated questionable SAH. She was transported to [facility] in [place], [place] for a Trauma Surgery trauma evaluation. An expeditious primary and secondary survey with required adjuncts was conducted. See Trauma Narrator for full details.    PAST MEDICAL HISTORY:  has a past medical history of Arthritis, Cerebral palsy (HCC), Dental disorder, Migraine, Pain, Psychiatric problem, Snoring, Transfusion history, and Unspecified disorder of thyroid.    PAST SURGICAL HISTORY:  has a past surgical history that includes tonsillectomy (1956); other (1968); hip arthroplasty total (10/1/2009); tenotomy (10/1/2009); and shoulder arthroplasty total reversed (Right, 9/20/2018).    ALLERGIES:   Allergies   Allergen Reactions    Bee Venom Swelling     anaphylaxis    Shellfish Allergy Swelling       CURRENT MEDICATIONS:   Home Medications    **Home medications have not yet been reviewed for this encounter**       Audit from Redirected Encounters    **Home medications have not yet been reviewed for this encounter**       FAMILY HISTORY: family history includes Alzheimer's Disease in her father.    SOCIAL HISTORY:  reports that she has never smoked. She has never used smokeless tobacco. She reports that she does not drink alcohol and does not use drugs.    REVIEW OF SYSTEMS: Review of systems is remarkable for the following right shoulder pain.  "The remainder of the comprehensive ROS is negative with the exception of the aforementioned HPI, PMH, and PSH bullets in accordance with CMS guideline.    PHYSICAL EXAMINATION:      Vital Signs: /56   Pulse 63   Temp 37.2 °C (99 °F) (Temporal)   Resp 14   Ht 1.626 m (5' 4\")   Wt 64 kg (141 lb)   SpO2 94%   Physical Exam  Constitutional:       General: She is not in acute distress.     Appearance: Normal appearance. She is normal weight. She is not ill-appearing.   HENT:      Head: Normocephalic and atraumatic.      Right Ear: External ear normal.      Left Ear: External ear normal.      Nose: Nose normal.      Mouth/Throat:      Mouth: Mucous membranes are moist.      Pharynx: Oropharynx is clear.   Eyes:      Extraocular Movements: Extraocular movements intact.      Pupils: Pupils are equal, round, and reactive to light.   Cardiovascular:      Rate and Rhythm: Normal rate and regular rhythm.      Pulses: Normal pulses.   Pulmonary:      Effort: Pulmonary effort is normal.      Breath sounds: Normal breath sounds.   Abdominal:      General: Abdomen is flat. There is no distension.      Palpations: Abdomen is soft.      Tenderness: There is no abdominal tenderness.   Musculoskeletal:         General: Tenderness (Right shoulder tenderness) present. No swelling.      Cervical back: Normal range of motion and neck supple. No tenderness.   Skin:     General: Skin is warm and dry.      Capillary Refill: Capillary refill takes less than 2 seconds.   Neurological:      Mental Status: She is alert.      Comments: History of cerebral palsy.  Chronic contractures of the right hand/wrist and ankles.  Strength is 5/5, intact sensation   Psychiatric:         Mood and Affect: Mood normal.         LABORATORY VALUES:   Recent Labs     09/02/24  1352   RBC 4.75   HEMOGLOBIN 13.7   HEMATOCRIT 40.5   MCV 85.2   MCH 29.0   MCHC 34.0   RDW 13.4   PLATELETCT 318   MPV 7.6                    IMAGING:   DX-CHEST-LIMITED (1 VIEW) "   Final Result      No evidence of acute cardiopulmonary process.      DX-SHOULDER 2+ RIGHT   Final Result      Post right shoulder arthroplasty. No evidence of fracture or dislocation of the arthroplasty components.      OUTSIDE IMAGES-CT HEAD   Final Result      CT-CSPINE WITHOUT PLUS RECONS    (Results Pending)       ASSESSMENT AND PLAN:     78 year old female with ground level fall   Mechanism unclear, possible syncopal event  Seen at [facility] with CT calling questionable trace SAH  She denies taking ASA or any other antiplatelet/anticoagulation, she is therefore BIG1 criteria  Given her cerebral palsy and possible syncopal episodes recommend admission to medical service for syncopal workup  TBI poses minimal risk and a repeat head CT or neurosurgical consultation  She needs a repeat exam by the trauma surgery service overnight at again in the AM  Trauma surgery will continue to follow and perform teriary survey in AM         ____________________________________     Nicola Mueller M.D.    DD: 9/2/2024  4:26 PM

## 2024-09-02 NOTE — ED PROVIDER NOTES
Emergency Physician Note    Chief Concern:  No chief complaint on file.        External Records Reviewed:  [facility] Everywhere: [facility] Everywhere was utilized to review the emergency department record from [facility] emergency department.  Patient was seen earlier today, emergency physician note reviewed from that visit.  She has a past medical history significant for cerebral palsy, and presented for evaluation after a fall.She does not remember the event, and is noted of a past medical history significant for cerebral palsy.  She reported pain localized to the right shoulder.  CTA of the head and neck demonstrates left temporal subarachnoid hemorrhage.  As [facility] does not have neurosurgical services, patient was transferred to [facility] for neurosurgical consultation.     HPI/ROS     Limitation to History:  Patient does not recall the fall, did have associated loss of consciousness     Outside Historians:   1.  Transporting paramedics provide additional history     HPI:  Letha Barbour is a 78 y.o. female who presents to the emergency department today as a transfer from [facility] emergency department for evaluation of a left temporal subarachnoid hemorrhage.  She had a fall earlier today while going out to her garden, uncertain if this fall was mechanical due to loss of balance, or may have been due to syncope.  She does not have any seizure history.  As she does not recall the event, history is limited.  On arrival to the emergency department she has some pain localized to the right shoulder, she is able to range to light right shoulder though range of motion is limited by pain as well as residual weakness due to history of cerebral palsy.  She has no severe headache, no nausea, no vomiting.  She is not currently on any anticoagulation antiplatelet agents.  She has not had any new or worsening symptoms, GCS is 15 on arrival to the emergency department with no new focal  deficits, though she does have residual right sided weakness due to history of cerebral palsy.    PAST MEDICAL HISTORY  Past Medical History:   Diagnosis Date    Arthritis     Cerebral palsy (HCC)     Dental disorder     peridontal disease    Migraine     Pain     right hip,right shoulder,pain scale 7    Psychiatric problem     anxiety and depression    Snoring     Transfusion history     Unspecified disorder of thyroid     hypothyroid       SURGICAL HISTORY  Past Surgical History:   Procedure Laterality Date    SHOULDER ARTHROPLASTY TOTAL REVERSED Right 9/20/2018    Procedure: RT SHOULDER ARTHROPLASTY TOTAL REVERSE;  Surgeon: Mariano Ballesteros M.D.;  Location: SURGERY [facility];  Service: Orthopedics    HIP ARTHROPLASTY TOTAL  10/1/2009    Performed by JAIME BRYANT at SURGERY [facility] ORS    TENOTOMY  10/1/2009    Performed by JAIME BRYANT at SURGERY [facility] ORS    OTHER  1968    right foot tendon lengthening     TONSILLECTOMY  1956       FAMILY HISTORY  Family History   Problem Relation Age of Onset    Alzheimer's Disease Father        SOCIAL HISTORY   reports that she has never smoked. She has never used smokeless tobacco. She reports that she does not drink alcohol and does not use drugs.    CURRENT MEDICATIONS  Previous Medications    CALCIUM-MAGNESIUM-VITAMIN D (GÓMEZ-MAG COMPLEX PO)    Take 1,000 mg by mouth 3 times a day. Indications: 1000mg/500mg    CHOLECALCIFEROL (VITAMIN D-3) 5000 UNITS TAB    Take  by mouth every day.    COBALAMINE COMBINATIONS (VITAMIN B12-FOLIC ACID PO)    Take 1,000 mcg by mouth every day.    ESCITALOPRAM (LEXAPRO) 10 MG TAB        HYDROCODONE-ACETAMINOPHEN (NORCO) 7.5-325 MG TAB    Take 1 Tab by mouth every four hours as needed for Mild Pain.    LEVOTHYROXINE (SYNTHROID) 112 MCG TAB    Take 88 mcg by mouth every day.    MULTIPLE VITAMINS-MINERALS (ZINC PO)    Take  by mouth.       ALLERGIES  Bee venom and Shellfish allergy    PHYSICAL EXAM  Vital Signs: BP  "119/67   Pulse 63   Temp 37.2 °C (99 °F) (Temporal)   Resp 17   Ht 1.626 m (5' 4\")   Wt 64 kg (141 lb)   SpO2 95%   BMI 24.20 kg/m²   Constitutional: Awake, alert, afebrile  HENT: No evidence of facial trauma, pupils 3 mm, equally round and reactive  Neck: Cervical spine nontender to palpation  Cardiovascular: No tachycardia, regular rate and rhythm  Pulmonary: No respiratory distress, normal work of breathing, breath sounds quiet and equal bilaterally  Abdomen: Soft, non tender, no discomfort on abdominal palpation  Skin: Warm, dry, no rashes or lesions, minor abrasion to the right shoulder  Musculoskeletal: Decreased range of motion to the right upper and right lower extremity which patient reports is her baseline due to history of cerebral palsy, muscle atrophy noted, no obvious deformity to the right shoulder  Neurologic: Awake and alert, no slurred speech, no evidence of active altered mental status    Diagnostic Studies & Procedures    Labs:  All labs reviewed by me as noted below.    EK Lead EKG interpreted by me as noted below  Results for orders placed or performed during the hospital encounter of 24   EKG   Result Value Ref Range    Report       [facility] Emergency Dept.    Test Date:  2024  Pt Name:    TERARNCE RANDOLPH               Department: ER  MRN:        [id number]                      Room:        20  Gender:     Female                       Technician: [id number]  :        [id number]                   Requested By:ROME CHRISTIAN  Order #:    [id number]                    Reading MD: LULY BRAVO MD    Measurements  Intervals                                Axis  Rate:       64                           P:          64  UT:         163                          QRS:        68  QRSD:       95                           T:          17  QT:         441  QTc:        455    Interpretive Statements  Rate 64, sinus rhythm, low voltage throughout all leads, no " prior available  for comparison.  Electronically Signed On 09- 21:50:00 PDT by LULY BRAVO MD         Radiology:  The attending Emergency Physician has independently interpreted the following imaging:  I independently interpreted the chest x-ray in the trauma bay, no pneumothorax identified, no displaced rib fractures identified.    CT-CSPINE WITHOUT PLUS RECONS   Final Result         1. No acute fracture from C1 through T1 is visualized.         DX-CHEST-LIMITED (1 VIEW)   Final Result      No evidence of acute cardiopulmonary process.      DX-SHOULDER 2+ RIGHT   Final Result      Post right shoulder arthroplasty. No evidence of fracture or dislocation of the arthroplasty components.      OUTSIDE IMAGES-CT HEAD   Final Result          Course and Medical Decision Making    Initial Assessment and Plan:  Ms. Barbour presents to the emergency department today for neurosurgical consultation due to traumatic subarachnoid hemorrhage diagnosed at outlying facility.  On arrival to the emergency department she shows no evidence of neurologic compromise.  No new focal deficits, though history of right sided residual weakness.  She has no headache, no nausea, no vomiting.     Paramedics did not report any anticoagulation or antiplatelet agent use.  RN noticed 81 mg aspirin dose on prior note from José Keys, patient confirms that she is not currently taking aspirin.  Platelet mapping with tag was ordered, that resulted normal without inhibition.    I discussed her presentation with Dr. Mueller, trauma critical care surgeon, trauma team will follow the patient during hospitalization for monitoring of subarachnoid hemorrhage.  Plan this time is for overnight monitoring given age, as well as uncertainty of the mechanism surrounding her fall.  Believe she would benefit from monitoring to evaluate for any evidence of hypotension, or syncopal symptoms prior to discharge.    Additional Problems and Disposition    I have  discussed management of the patient with the following physicians:   Dr. Pinzon, Radiologist, regarding adequacy of transferred imaging with regard to evaluation for C-spine fracture.  Dr. Mueller, Trauma Critical Care Surgeon  Dr. Mantilla, Hospitalist    Escalation of care considered, and ultimately not performed:  1.  Neurosurgical consultation -neurosurgical consultation considered, however patient needs brain injury guideline 1 criteria, no indication for emergent neurosurgical consultation at this time.    Disposition:  Admit in stable condition    FINAL IMPRESSION   1. Closed head injury, initial encounter    2. Subarachnoid hemorrhage (HCC)

## 2024-09-02 NOTE — ED TRIAGE NOTES
Bib idalmis fire from [facility] transfer for SAH  Right shoulder pain 5/10  A/o x 4  Fall while gardening today

## 2024-09-02 NOTE — ED NOTES
Instructed to send glucose logs in 7 days.  Reach out to me sooner for any glucose <70 or consistently >200.    Hypoglycemia (Low Blood Glucose)  Low blood glucose occurs with the following conditions.  · Not Enough Food or Missing Meal.  · Too Much Insulin  · More Exercise Than Usual    It is best to use something that you can always carry with you. Choose a food that is all carbohydrate because it will be very fast acting. Try not to choose chocolate or other high fat foods. They will not work fast enough and you may also end up over-treating your lows. The suggested amount of carbohydrate to start with is 15 grams. Don't keep eating until you feel better. Eat the required amount and stop. The feelings will pass and you will be grateful that you did not overdo it.    Some people with diabetes know when their blood glucose is low and some do not. If you are a person who is not aware of hypoglycemia, it is important to test your blood glucose more often. Everyone with diabetes should test before driving a car to assure safety on the road. Blood glucose should be above 100 mg/dl before driving and at bedtime.     The symptoms of low blood sugars are usually heart racing, sweating, anxiety, feeling hungry, tremor, weakness or most severely loss of consciousness.     Rule of 15:    Test your blood sugar   If glucose is between 50-70 mg/dL then ingest 15 grams of fast-acting carbs   If glucose is less than 50 mg/dL then ingest 30 grams of fast-acting carbs   Ingest 15 grams of fast-acting carbohydrate - such as:   a. 3-4 glucose tablets  b. 4 oz juice  c. ½ can regular soda pop  d. 15 skittles or mini jelly beans    Re-check your blood sugar in 15 minutes. If its less than 70mg/dl, repeat steps 2 and 3.   If your next meal is more than 1 hour away, eat an additional 15 grams of carbohydrate and 1 ounce of protein (examples include crackers with cheese or one-half of a sandwich with peanut butter). It is important not  Taking belongings and  pt to room 20 from lobby, xrays done,awaiting orders for more imaging.    to eat too much because this can raise your blood sugar above the target level.    After your blood sugar has normalized, think about why you went low. If you notice a pattern of low blood sugars, contact your Diabetes Team. We may need to adjust your medication.

## 2024-09-03 PROBLEM — R55 SYNCOPE AND COLLAPSE: Status: ACTIVE | Noted: 2024-09-03

## 2024-09-03 LAB
ANION GAP SERPL CALC-SCNC: 11 MMOL/L (ref 7–16)
BUN SERPL-MCNC: 14 MG/DL (ref 8–22)
CALCIUM SERPL-MCNC: 8.4 MG/DL (ref 8.5–10.5)
CHLORIDE SERPL-SCNC: 111 MMOL/L (ref 96–112)
CO2 SERPL-SCNC: 20 MMOL/L (ref 20–33)
CREAT SERPL-MCNC: 0.62 MG/DL (ref 0.5–1.4)
ERYTHROCYTE [DISTWIDTH] IN BLOOD BY AUTOMATED COUNT: 41.1 FL (ref 35.9–50)
GFR SERPLBLD CREATININE-BSD FMLA CKD-EPI: 92 ML/MIN/1.73 M 2
GLUCOSE SERPL-MCNC: 100 MG/DL (ref 65–99)
HCT VFR BLD AUTO: 36.1 % (ref 37–47)
HGB BLD-MCNC: 12.3 G/DL (ref 12–16)
MCH RBC QN AUTO: 29.8 PG (ref 27–33)
MCHC RBC AUTO-ENTMCNC: 34.1 G/DL (ref 32.2–35.5)
MCV RBC AUTO: 87.4 FL (ref 81.4–97.8)
PLATELET # BLD AUTO: 262 K/UL (ref 164–446)
PMV BLD AUTO: 9.4 FL (ref 9–12.9)
POTASSIUM SERPL-SCNC: 3.5 MMOL/L (ref 3.6–5.5)
RBC # BLD AUTO: 4.13 M/UL (ref 4.2–5.4)
SODIUM SERPL-SCNC: 142 MMOL/L (ref 135–145)
WBC # BLD AUTO: 8.7 K/UL (ref 4.8–10.8)

## 2024-09-03 PROCEDURE — 770020 HCHG ROOM/CARE - TELE (206)

## 2024-09-03 PROCEDURE — A9270 NON-COVERED ITEM OR SERVICE: HCPCS | Performed by: STUDENT IN AN ORGANIZED HEALTH CARE EDUCATION/TRAINING PROGRAM

## 2024-09-03 PROCEDURE — 700102 HCHG RX REV CODE 250 W/ 637 OVERRIDE(OP): Performed by: STUDENT IN AN ORGANIZED HEALTH CARE EDUCATION/TRAINING PROGRAM

## 2024-09-03 PROCEDURE — 700101 HCHG RX REV CODE 250: Performed by: STUDENT IN AN ORGANIZED HEALTH CARE EDUCATION/TRAINING PROGRAM

## 2024-09-03 PROCEDURE — 85027 COMPLETE CBC AUTOMATED: CPT

## 2024-09-03 PROCEDURE — 700105 HCHG RX REV CODE 258: Performed by: STUDENT IN AN ORGANIZED HEALTH CARE EDUCATION/TRAINING PROGRAM

## 2024-09-03 PROCEDURE — 99999 PR NO CHARGE: CPT | Mod: DUPCHRG | Performed by: NURSE PRACTITIONER

## 2024-09-03 PROCEDURE — 80048 BASIC METABOLIC PNL TOTAL CA: CPT

## 2024-09-03 PROCEDURE — 99232 SBSQ HOSP IP/OBS MODERATE 35: CPT | Performed by: STUDENT IN AN ORGANIZED HEALTH CARE EDUCATION/TRAINING PROGRAM

## 2024-09-03 RX ORDER — TRAMADOL HYDROCHLORIDE 50 MG/1
50 TABLET ORAL EVERY 6 HOURS PRN
Status: DISCONTINUED | OUTPATIENT
Start: 2024-09-03 | End: 2024-09-04 | Stop reason: HOSPADM

## 2024-09-03 RX ORDER — LIDOCAINE 4 G/G
1 PATCH TOPICAL EVERY 24 HOURS
Status: DISCONTINUED | OUTPATIENT
Start: 2024-09-03 | End: 2024-09-04 | Stop reason: HOSPADM

## 2024-09-03 RX ADMIN — LEVOTHYROXINE SODIUM 88 MCG: 0.09 TABLET ORAL at 06:42

## 2024-09-03 RX ADMIN — LIOTHYRONINE SODIUM 2.5 MCG: 5 TABLET ORAL at 06:42

## 2024-09-03 RX ADMIN — ACETAMINOPHEN 650 MG: 325 TABLET ORAL at 15:03

## 2024-09-03 RX ADMIN — SODIUM CHLORIDE: 9 INJECTION, SOLUTION INTRAVENOUS at 04:51

## 2024-09-03 RX ADMIN — LIDOCAINE 1 PATCH: 4 PATCH TOPICAL at 09:00

## 2024-09-03 ASSESSMENT — ENCOUNTER SYMPTOMS
RESPIRATORY NEGATIVE: 1
PSYCHIATRIC NEGATIVE: 1
HEMOPTYSIS: 0
DIZZINESS: 0
CARDIOVASCULAR NEGATIVE: 1
SHORTNESS OF BREATH: 0
HEADACHES: 1
EYES NEGATIVE: 1
GASTROINTESTINAL NEGATIVE: 1
CONSTITUTIONAL NEGATIVE: 1

## 2024-09-03 ASSESSMENT — PAIN DESCRIPTION - PAIN TYPE
TYPE: ACUTE PAIN

## 2024-09-03 NOTE — PROGRESS NOTES
4 Eyes Skin Assessment Completed by OWEN Nascimento and OWEN García.    Head Bruising, R Temp  Ears WDL  Nose WDL  Mouth WDL  Neck WDL  Breast/Chest WDL  Shoulder Blades Redness and Blanching, Abraision/bruise on Right shoulder  Spine WDL  (R) Arm/Elbow/Hand Bruising and Abrasion Elbow  (L) Arm/Elbow/Hand WDL  Abdomen WDL  Groin WDL  Scrotum/Coccyx/Buttocks WDL  (R) Leg WDL  (L) Leg WDL  (R) Heel/Foot/Toe Redness and Blanching on shin  (L) Heel/Foot/Toe Discoloration, Calluses          Devices In Places Tele Box and Pulse Ox      Interventions In Place Pillows    Possible Skin Injury No    Pictures Uploaded Into Epic Yes  Wound Consult Placed N/A  RN Wound Prevention Protocol Ordered No

## 2024-09-03 NOTE — ASSESSMENT & PLAN NOTE
CT at outside hospital showing left temporal subarachnoid hemorrhage.    Trauma surgery consulted and s/o, no intervention warranted  Neurochecks q4h, repeat CTH if interval neurologic deficit  Avoid aspirin/NSAIDs/anticoagulant medication

## 2024-09-03 NOTE — ED NOTES
Pt medicated per MAR , pt tolerated , Pt resting on gurney, pt attached to monitor, respirations are equal and unlabored, call light in reach , bed locked and in lowest position, no needs at this time

## 2024-09-03 NOTE — PROGRESS NOTES
"      Mental status adequate for full examination?: Yes    Spine cleared (radiologically and/or clinically): Yes    REVIEW OF SYSTEMS:  Review of Systems   Constitutional: Negative.    HENT: Negative.     Eyes: Negative.    Respiratory: Negative.     Cardiovascular: Negative.    Gastrointestinal: Negative.    Genitourinary: Negative.    Musculoskeletal:  Positive for joint pain.   Skin: Negative.    Endo/Heme/Allergies: Negative.    Psychiatric/Behavioral: Negative.         PHYSICAL EXAMINATION:  /57   Pulse 71   Temp 37.2 °C (99 °F) (Temporal)   Resp 17   Ht 1.626 m (5' 4\")   Wt 64 kg (141 lb)   SpO2 93%   BMI 24.20 kg/m²   Physical Exam  Vitals and nursing note reviewed.   Constitutional:       General: She is not in acute distress.     Appearance: Normal appearance. She is normal weight. She is not ill-appearing.   HENT:      Head: Normocephalic and atraumatic.      Right Ear: External ear normal.      Left Ear: External ear normal.   Pulmonary:      Effort: Pulmonary effort is normal.   Abdominal:      General: Abdomen is flat. There is no distension.      Palpations: Abdomen is soft.      Tenderness: There is no abdominal tenderness.   Musculoskeletal:         General: Tenderness present. No swelling.      Cervical back: Normal range of motion and neck supple. No tenderness.   Skin:     General: Skin is warm and dry.      Capillary Refill: Capillary refill takes less than 2 seconds.      Comments: Abrasion to shoulder   Neurological:      Mental Status: She is alert.      Comments: History of cerebral palsy.  Chronic contractures of the right hand/wrist and ankles.  Strength is 5/5, intact sensation   Psychiatric:         Mood and Affect: Mood normal.         LABORATORY VALUES:  Recent Labs     09/02/24  1352 09/03/24  0437   WBC  --  8.7   RBC 4.75 4.13*   HEMOGLOBIN 13.7 12.3   HEMATOCRIT 40.5 36.1*   MCV 85.2 87.4   MCH 29.0 29.8   MCHC 34.0 34.1   RDW 13.4 41.1   PLATELETCT 318 262   MPV 7.6 " 9.4     Recent Labs     09/03/24  0437   SODIUM 142   POTASSIUM 3.5*   CHLORIDE 111   CO2 20   GLUCOSE 100*   BUN 14   CREATININE 0.62   CALCIUM 8.4*               IMAGING:  CT-CSPINE WITHOUT PLUS RECONS   Final Result         1. No acute fracture from C1 through T1 is visualized.         DX-CHEST-LIMITED (1 VIEW)   Final Result      No evidence of acute cardiopulmonary process.      DX-SHOULDER 2+ RIGHT   Final Result      Post right shoulder arthroplasty. No evidence of fracture or dislocation of the arthroplasty components.      OUTSIDE IMAGES-CT HEAD   Final Result      EC-ECHOCARDIOGRAM COMPLETE W/O CONT    (Results Pending)       RAP Score Total: 7      CAGE Results: not completed Blood Alcohol>0.08: not completed       PDI Score:Not completed    (Score > 23 = Psychiatry consult)    All current laboratory studies/radiology exams reviewed: Yes    Medications reconciliation has been reviewed: Yes    Completed Consultations:  Medical admit     Pending Consultations:      Newly identified diagnoses, injuries and/or co-morbidities:  Trauma will sign off     Discussed patient condition with RN.

## 2024-09-03 NOTE — CARE PLAN
The patient is Watcher - Medium risk of patient condition declining or worsening         Progress made toward(s) clinical / shift goals:    Problem: Pain - Standard  Goal: Alleviation of pain or a reduction in pain to the patient’s comfort goal  Outcome: Progressing     Problem: Neuro Status  Goal: Neuro status will remain stable or improve  Outcome: Progressing       Patient is not progressing towards the following goals:

## 2024-09-03 NOTE — ED NOTES
Pt resting on gurney, pt attached to monitor, respirations are equal and unlabored, call light in reach , bed locked and in lowest position, no needs at this time

## 2024-09-03 NOTE — ED NOTES
New bag of fluids hung , labs drawn and sent , pt ambulated to restroom with steady gait , pt assisted back into bed , pt attached to monitor , call light in reach , no needs at this time

## 2024-09-03 NOTE — ED NOTES
Bedside report received from off going RN/tech: Yoshi assumed care of patient.  POC discussed with patient. Call light within reach, all needs addressed at this time.       Fall risk interventions in place: Move the patient closer to the nurse's station, Patient's personal possessions are with in their safe reach, Place socks on patient, Place fall risk sign on patient's door, Give patient urinal if applicable, Keep floor surfaces clean and dry, Accompanied to restroom, and Bed Alarm in use (all applicable per [place] Fall risk assessment)   Continuous monitoring: Cardiac Leads, Pulse Ox, or Blood Pressure  IVF/IV medications: Infusion per MAR (List Med(s)) NS @ 100  Oxygen: Room Air  Bedside sitter: Not Applicable   Isolation: Not Applicable

## 2024-09-03 NOTE — ASSESSMENT & PLAN NOTE
16 minutes spent discussing goals of care with patient.  I explained to her that she will be monitored for her subarachnoid hemorrhage and will have workup for syncope.  We discussed CODE STATUS, patient states that she would like to be full code and have everything done.

## 2024-09-03 NOTE — H&P
Hospital Medicine History & Physical Note    Date of Service  9/2/2024    Primary Care Physician  COLLEEN Reveles.    Consultants  Trauma surgery    Code Status  Full Code    Chief Complaint  SAH    History of Presenting Illness  Letha Barbour is a 78 y.o. female who presented 9/2/2024 with a mechanical fall.  Patient has a history of cerebral palsy.  She was out in her garden today.  She was ambulating when she suddenly felt dizzy and does not remember what happened after.  She believes that she fell on the right side of her body and hit her head.  She woke up on the bed with people around her.  She denies any history of syncope, falls, ICH.    At [facility]:  Initial troponin 4, INR 1.0  White blood cell count 15.9, hemoglobin 13.7, platelet 318  Sodium 138 potassium 3.8 chloride 107 bicarbonate 24 alkaline phosphatase 68 AST 19 ALT 12 BUN 21 creatinine 0.8  CT head showing left temporal subarachnoid hemorrhage     Patient transferred to [facility] for further care  Patient was seen by trauma surgery and will follow.  No immediate intervention from their standpoint.  Recommended workup for potential syncope.    I discussed the plan of care with patient.    Review of Systems  Review of Systems   Constitutional: Negative.    HENT: Negative.     Eyes: Negative.    Respiratory: Negative.     Cardiovascular: Negative.    Gastrointestinal: Negative.    Genitourinary: Negative.    Musculoskeletal: Negative.    Skin: Negative.    Neurological:  Positive for loss of consciousness.   Endo/Heme/Allergies: Negative.    Psychiatric/Behavioral: Negative.         Past Medical History   has a past medical history of Arthritis, Cerebral palsy (HCC), Dental disorder, Migraine, Pain, Psychiatric problem, Snoring, Transfusion history, and Unspecified disorder of thyroid.    Surgical History   has a past surgical history that includes tonsillectomy (1956); other (1968); hip arthroplasty total (10/1/2009); tenotomy (10/1/2009);  and shoulder arthroplasty total reversed (Right, 9/20/2018).     Family History  family history includes Alzheimer's Disease in her father.   Family history reviewed with patient. There is no family history that is pertinent to the chief complaint.     Social History   reports that she has never smoked. She has never used smokeless tobacco. She reports that she does not drink alcohol and does not use drugs.    Allergies  Allergies   Allergen Reactions    Bee Venom Swelling     anaphylaxis    Shellfish Allergy Swelling       Medications  Prior to Admission Medications   Prescriptions Last Dose Informant Patient Reported? Taking?   Calcium-Magnesium-Vitamin D (GÓMEZ-MAG COMPLEX PO)   Yes No   Sig: Take 1,000 mg by mouth 3 times a day. Indications: 1000mg/500mg   Cholecalciferol (VITAMIN D-3) 5000 units Tab   Yes No   Sig: Take  by mouth every day.   Cobalamine Combinations (VITAMIN B12-FOLIC ACID PO)   Yes No   Sig: Take 1,000 mcg by mouth every day.   HYDROcodone-acetaminophen (NORCO) 7.5-325 MG tab   Yes No   Sig: Take 1 Tab by mouth every four hours as needed for Mild Pain.   Multiple Vitamins-Minerals (ZINC PO)   Yes No   Sig: Take  by mouth.   escitalopram (LEXAPRO) 10 MG Tab   Yes No   Patient not taking: Reported on 3/27/2024   levothyroxine (SYNTHROID) 112 MCG Tab   Yes No   Sig: Take 88 mcg by mouth every day.      Facility-Administered Medications: None       Physical Exam  Temp:  [37.2 °C (99 °F)] 37.2 °C (99 °F)  Pulse:  [63-81] 63  Resp:  [14-17] 17  BP: (119-131)/(56-67) 119/67  SpO2:  [93 %-96 %] 95 %  Blood Pressure : 119/67   Temperature: 37.2 °C (99 °F)   Pulse: 63   Respiration: 17   Pulse Oximetry: 95 %       Physical Exam  Constitutional:       Appearance: Normal appearance. She is normal weight.   HENT:      Head: Normocephalic.      Nose: Nose normal.      Mouth/Throat:      Mouth: Mucous membranes are moist.   Eyes:      Pupils: Pupils are equal, round, and reactive to light.   Cardiovascular:     "  Rate and Rhythm: Normal rate and regular rhythm.      Pulses: Normal pulses.   Pulmonary:      Effort: Pulmonary effort is normal.      Breath sounds: Normal breath sounds.   Abdominal:      General: Abdomen is flat. Bowel sounds are normal.      Palpations: Abdomen is soft.   Musculoskeletal:         General: Normal range of motion.      Cervical back: Neck supple.      Comments: Right wrist in contracted position  Right foot in contracted position     Skin:     General: Skin is warm.   Neurological:      General: No focal deficit present.      Mental Status: She is alert and oriented to person, place, and time. Mental status is at baseline.   Psychiatric:         Mood and Affect: Mood normal.         Behavior: Behavior normal.         Thought Content: Thought content normal.         Judgment: Judgment normal.         Laboratory:  Recent Labs     09/02/24  1352   RBC 4.75   HEMOGLOBIN 13.7   HEMATOCRIT 40.5   MCV 85.2   MCH 29.0   MCHC 34.0   RDW 13.4   PLATELETCT 318   MPV 7.6         No results for input(s): \"ALTSGPT\", \"ASTSGOT\", \"ALKPHOSPHAT\", \"TBILIRUBIN\", \"DBILIRUBIN\", \"GAMMAGT\", \"AMYLASE\", \"LIPASE\", \"ALB\", \"PREALBUMIN\", \"GLUCOSE\" in the last 72 hours.      No results for input(s): \"NTPROBNP\" in the last 72 hours.      No results for input(s): \"TROPONINT\" in the last 72 hours.    Imaging:  CT-CSPINE WITHOUT PLUS RECONS   Final Result         1. No acute fracture from C1 through T1 is visualized.         DX-CHEST-LIMITED (1 VIEW)   Final Result      No evidence of acute cardiopulmonary process.      DX-SHOULDER 2+ RIGHT   Final Result      Post right shoulder arthroplasty. No evidence of fracture or dislocation of the arthroplasty components.      OUTSIDE IMAGES-CT HEAD   Final Result          EKG:  I have personally reviewed the images and compared with prior images.    Assessment/Plan:  Justification for Admission Status  I anticipate this patient will require at least two midnights for appropriate medical " management, necessitating inpatient admission because pt has a SAH    Patient will need a Telemetry bed on NEUROLOGY service .  The need is secondary to syncope.    * SAH (subarachnoid hemorrhage) (HCC)- (present on admission)  Assessment & Plan  Unclear cause of fall, possibly syncope.  Initial troponin negative  CT at outside hospital showing left temporal subarachnoid hemorrhage.  No immediate intervention from surgery  Echocardiogram  PT OT  Neurocheck every 4 hours  Avoid aspirin/NSAIDs/anticoagulant medication      Hypothyroidism  Assessment & Plan  Resume home meds    Cerebral palsy (HCC)- (present on admission)  Assessment & Plan  History of  Patient able to provide reliable history in my opinion    ACP (advance care planning)  Assessment & Plan  16 minutes spent discussing goals of care with patient.  I explained to her that she will be monitored for her subarachnoid hemorrhage and will have workup for syncope.  We discussed CODE STATUS, patient states that she would like to be full code and have everything done.        VTE prophylaxis: pharmacologic prophylaxis contraindicated due to subarachnoid hemorrhage    Total time spent on visit 110 minutes reviewing records from outside facility, discussing diagnosis, treatment plan, risk and benefit, prognosis

## 2024-09-03 NOTE — ED NOTES
Bedside report received from off going RN/tech: Dewayne, assumed care of patient. POC discussed with patient. Call light within reach, all needs addressed at this time.       Fall risk interventions in place: Patient's personal possessions are with in their safe reach, Place fall risk sign on patient's door, and Keep floor surfaces clean and dry (all applicable per [place] Fall risk assessment)   Continuous monitoring: Cardiac Leads, Pulse Ox, or Blood Pressure  IVF/IV medications: Infusion per MAR (List Med(s)) NS at 100 mL/hr, see MAR.  Oxygen: Room Air  Bedside sitter: Not Applicable   Isolation: Not Applicable

## 2024-09-03 NOTE — PROGRESS NOTES
"  [facility] Trauma Progress Note    Briefly, this is a 78 y.o. female with trace subarachnoid hemorrhage following a ground level fall. TEG with platelet mapping without significant coagulopathy. She has been admitted under the medicine service for evaluation of syncope.     /68   Pulse 63   Temp 37.2 °C (99 °F) (Temporal)   Resp 16   Ht 1.626 m (5' 4\")   Wt 64 kg (141 lb)   SpO2 94%   BMI 24.20 kg/m²       Intake/Output Summary (Last 24 hours) at 9/2/2024 1931  Last data filed at 9/2/2024 1604  Gross per 24 hour   Intake 0 ml   Output 0 ml   Net 0 ml     Lab Results   Component Value Date/Time    WBC 7.2 09/11/2018 01:30 PM    RBC 4.75 09/02/2024 01:52 PM    RBC 4.41 09/11/2018 01:30 PM    HEMOGLOBIN 13.7 09/02/2024 01:52 PM    HEMOGLOBIN 13.0 09/11/2018 01:30 PM    HEMATOCRIT 40.5 09/02/2024 01:52 PM    HEMATOCRIT 38.1 (L) 09/11/2018 01:30 PM    MCV 85.2 09/02/2024 01:52 PM    MCV 86.4 09/11/2018 01:30 PM    MCH 29.0 09/02/2024 01:52 PM    MCH 29.5 09/11/2018 01:30 PM    MCHC 34.0 09/02/2024 01:52 PM    MCHC 34.1 09/11/2018 01:30 PM    MPV 7.6 09/02/2024 01:52 PM    MPV 9.7 09/11/2018 01:30 PM    NEUTSPOLYS 87.0 (H) 09/02/2024 01:52 PM    NEUTSPOLYS 60.8 09/21/2009 03:06 PM    LYMPHOCYTES 7.6 (L) 09/02/2024 01:52 PM    LYMPHOCYTES 26.5 09/21/2009 03:06 PM    MONOCYTES 5.0 09/02/2024 01:52 PM    MONOCYTES 9.2 (H) 09/21/2009 03:06 PM    EOSINOPHILS 0.1 09/02/2024 01:52 PM    EOSINOPHILS 2.5 09/21/2009 03:06 PM    BASOPHILS 0.3 09/02/2024 01:52 PM    BASOPHILS 1.0 09/21/2009 03:06 PM      Lab Results   Component Value Date/Time    SODIUM 140 03/02/2022 07:06 AM    SODIUM 139 09/11/2018 01:30 PM    POTASSIUM 4.3 03/02/2022 07:06 AM    POTASSIUM 3.9 09/11/2018 01:30 PM    CHLORIDE 107 03/02/2022 07:06 AM    CHLORIDE 106 09/11/2018 01:30 PM    CO2 23 03/02/2022 07:06 AM    CO2 26 09/11/2018 01:30 PM    GLUCOSE 95 03/02/2022 07:06 AM    GLUCOSE 93 09/11/2018 01:30 PM    BUN 31 (H) 03/02/2022 07:06 AM    BUN 24 " (H) 09/11/2018 01:30 PM    CREATININE 0.68 03/02/2022 07:06 AM    CREATININE 0.8 09/11/2018 01:30 PM    BUNCREATRAT 46 (H) 03/02/2022 07:06 AM    GLOMRATE 100 02/07/2022 09:44 AM      Lab Results   Component Value Date/Time    PROTHROMBTM 15.6 (H) 10/04/2009 05:20 AM    INR 1.40 (H) 10/04/2009 05:20 AM      Recent Labs     09/02/24  1651   REACTMIN 5.6   CLOTKINET 0.9   CLOTANGL 76.0   MAXCLOTS 65.1   PRCINADP 12.6   PRCINAA 0.0     Assessment:  Patient resting, awakens easily to voice. GCS 15, no focal deficits. She does report a mild headache. She is tolerating oral diet without N/V. Right shoulder pain- plain films negative for acute fracture or dislocation.    Additional Plans:  Frequent neuro checks.   Repeat head CT with decline in GCS of 2 or more.   Avoid aspirin and NSAIDs.

## 2024-09-04 ENCOUNTER — PHARMACY VISIT (OUTPATIENT)
Dept: PHARMACY | Facility: MEDICAL CENTER | Age: 79
End: 2024-09-04
Payer: COMMERCIAL

## 2024-09-04 VITALS
RESPIRATION RATE: 17 BRPM | DIASTOLIC BLOOD PRESSURE: 96 MMHG | TEMPERATURE: 97.3 F | SYSTOLIC BLOOD PRESSURE: 123 MMHG | HEIGHT: 64 IN | WEIGHT: 152.12 LBS | BODY MASS INDEX: 25.97 KG/M2 | HEART RATE: 71 BPM | OXYGEN SATURATION: 95 %

## 2024-09-04 PROCEDURE — 700102 HCHG RX REV CODE 250 W/ 637 OVERRIDE(OP): Performed by: INTERNAL MEDICINE

## 2024-09-04 PROCEDURE — 700102 HCHG RX REV CODE 250 W/ 637 OVERRIDE(OP): Performed by: STUDENT IN AN ORGANIZED HEALTH CARE EDUCATION/TRAINING PROGRAM

## 2024-09-04 PROCEDURE — 97166 OT EVAL MOD COMPLEX 45 MIN: CPT

## 2024-09-04 PROCEDURE — 97162 PT EVAL MOD COMPLEX 30 MIN: CPT

## 2024-09-04 PROCEDURE — 700102 HCHG RX REV CODE 250 W/ 637 OVERRIDE(OP)

## 2024-09-04 PROCEDURE — RXMED WILLOW AMBULATORY MEDICATION CHARGE: Performed by: INTERNAL MEDICINE

## 2024-09-04 PROCEDURE — A9270 NON-COVERED ITEM OR SERVICE: HCPCS | Performed by: STUDENT IN AN ORGANIZED HEALTH CARE EDUCATION/TRAINING PROGRAM

## 2024-09-04 PROCEDURE — 700101 HCHG RX REV CODE 250: Performed by: STUDENT IN AN ORGANIZED HEALTH CARE EDUCATION/TRAINING PROGRAM

## 2024-09-04 PROCEDURE — 99239 HOSP IP/OBS DSCHRG MGMT >30: CPT | Performed by: INTERNAL MEDICINE

## 2024-09-04 PROCEDURE — 92523 SPEECH SOUND LANG COMPREHEN: CPT

## 2024-09-04 PROCEDURE — A9270 NON-COVERED ITEM OR SERVICE: HCPCS | Performed by: INTERNAL MEDICINE

## 2024-09-04 PROCEDURE — 97535 SELF CARE MNGMENT TRAINING: CPT

## 2024-09-04 PROCEDURE — A9270 NON-COVERED ITEM OR SERVICE: HCPCS

## 2024-09-04 RX ORDER — GAUZE BANDAGE 2" X 2"
100 BANDAGE TOPICAL DAILY
Status: DISCONTINUED | OUTPATIENT
Start: 2024-09-04 | End: 2024-09-04 | Stop reason: HOSPADM

## 2024-09-04 RX ORDER — ACETAMINOPHEN 325 MG/1
650 TABLET ORAL EVERY 6 HOURS PRN
Qty: 30 TABLET | Refills: 0 | Status: SHIPPED | OUTPATIENT
Start: 2024-09-04

## 2024-09-04 RX ORDER — LIDOCAINE 4 G/G
1 PATCH TOPICAL EVERY 24 HOURS
COMMUNITY
Start: 2024-09-05

## 2024-09-04 RX ORDER — LANOLIN ALCOHOL/MO/W.PET/CERES
100 CREAM (GRAM) TOPICAL DAILY
COMMUNITY
Start: 2024-09-04

## 2024-09-04 RX ADMIN — LEVOTHYROXINE SODIUM 88 MCG: 0.09 TABLET ORAL at 04:27

## 2024-09-04 RX ADMIN — LIDOCAINE 1 PATCH: 4 PATCH TOPICAL at 09:03

## 2024-09-04 RX ADMIN — TRAMADOL HYDROCHLORIDE 50 MG: 50 TABLET ORAL at 01:03

## 2024-09-04 RX ADMIN — Medication 100 MG: at 12:45

## 2024-09-04 RX ADMIN — THERA TABS 1 TABLET: TAB at 12:45

## 2024-09-04 RX ADMIN — LIOTHYRONINE SODIUM 2.5 MCG: 5 TABLET ORAL at 04:27

## 2024-09-04 ASSESSMENT — COGNITIVE AND FUNCTIONAL STATUS - GENERAL
EATING MEALS: A LITTLE
STANDING UP FROM CHAIR USING ARMS: A LITTLE
WALKING IN HOSPITAL ROOM: A LITTLE
DRESSING REGULAR LOWER BODY CLOTHING: A LITTLE
SUGGESTED CMS G CODE MODIFIER MOBILITY: CK
HELP NEEDED FOR BATHING: A LITTLE
SUGGESTED CMS G CODE MODIFIER MOBILITY: CK
CLIMB 3 TO 5 STEPS WITH RAILING: A LITTLE
DAILY ACTIVITIY SCORE: 19
SUGGESTED CMS G CODE MODIFIER DAILY ACTIVITY: CK
MOVING TO AND FROM BED TO CHAIR: A LITTLE
DRESSING REGULAR UPPER BODY CLOTHING: A LITTLE
HELP NEEDED FOR BATHING: A LITTLE
WALKING IN HOSPITAL ROOM: A LITTLE
MOBILITY SCORE: 17
DAILY ACTIVITIY SCORE: 18
MOVING TO AND FROM BED TO CHAIR: A LITTLE
TOILETING: A LITTLE
PERSONAL GROOMING: A LITTLE
PERSONAL GROOMING: A LITTLE
MOBILITY SCORE: 18
CLIMB 3 TO 5 STEPS WITH RAILING: A LOT
TURNING FROM BACK TO SIDE WHILE IN FLAT BAD: A LITTLE
TOILETING: A LITTLE
DRESSING REGULAR LOWER BODY CLOTHING: A LITTLE
SUGGESTED CMS G CODE MODIFIER DAILY ACTIVITY: CK
DRESSING REGULAR UPPER BODY CLOTHING: A LITTLE
TURNING FROM BACK TO SIDE WHILE IN FLAT BAD: A LITTLE
MOVING FROM LYING ON BACK TO SITTING ON SIDE OF FLAT BED: A LITTLE
STANDING UP FROM CHAIR USING ARMS: A LITTLE
MOVING FROM LYING ON BACK TO SITTING ON SIDE OF FLAT BED: A LITTLE

## 2024-09-04 ASSESSMENT — GAIT ASSESSMENTS
GAIT LEVEL OF ASSIST: SUPERVISED
DISTANCE (FEET): 350

## 2024-09-04 ASSESSMENT — PAIN DESCRIPTION - PAIN TYPE
TYPE: ACUTE PAIN

## 2024-09-04 ASSESSMENT — ACTIVITIES OF DAILY LIVING (ADL): TOILETING: INDEPENDENT

## 2024-09-04 NOTE — CARE PLAN
The patient is watcher    Shift Goals  Clinical Goals: q4 neuro check, pain  Patient Goals: rest, sleep  Family Goals: shady    Progress made toward(s) clinical / shift goals:     Problem: Pain - Standard  Goal: Alleviation of pain or a reduction in pain to the patient’s comfort goal  Outcome: Progressing   Frequent pain assessments throughout shift. PRN pain medications provided per MAR and pt request. Pharmacological and non-pharmacological interventions utilized.      Problem: Neuro Status  Goal: Neuro status will remain stable or improve  Outcome: Progressing   Q4H neuro checks in place. Pt's neurological status (A/Ox4) remains unchanged throughout shift. Bed alarm is on, call light within reach.

## 2024-09-04 NOTE — CARE PLAN
The patient is Stable - Low risk of patient condition declining or worsening    Shift Goals  Clinical Goals: Neuro Checks, Safety  Patient Goals: Rest, Ambulate  Family Goals: shady    Progress made toward(s) clinical / shift goals:    Problem: Pain - Standard  Goal: Alleviation of pain or a reduction in pain to the patient’s comfort goal  Outcome: Progressing     Problem: Neuro Status  Goal: Neuro status will remain stable or improve  Outcome: Progressing       Patient is not progressing towards the following goals:

## 2024-09-04 NOTE — THERAPY
"Occupational Therapy   Initial Evaluation     Patient Name: Letha Barbour  Age:  78 y.o., Sex:  female  Medical Record #: [id number]  Today's Date: 9/4/2024     Precautions: Swallow Precautions  Comments: Hx of CP    Assessment  Patient is 78 y.o. female admitted from outside facility after a GLF due a syncopal episode. Imaging from outside facility showed a left temporal subarachnoid hemorrhage. PMHx of CP, arthritis, and hypothyroidism. Pt seen for OT eval and tx. Pt currently resides with her family in a 1-story house and was independent with ADLs and IADLs. Pt reports that she was driving PTA.     During OT eval, pt demo ability to complete ADLs, functional mobility, and txfs with supv-SBA and no AD. Pt reported an increase in pain in her head during session, but had no c/o dizziness with activity. Pt with no LOB. Pt has baseline RUE ROM and strength deficits due to CP and she naturally rests in an internal rotated, flexed position with a composite fist. Pt reported no further concerns regarding ability to safely complete ADLs and txfs after DC. Pt has no further acute OT needs, but recommend home health for further OT services following DC. Recommend that pt continue to complete OOB ADLs with staff to reduce risk of deconditioning while in house.     Patient will not be actively followed for occupational therapy services at this time, however may be seen if requested by physician for 1 more visit within 30 days to address any discharge or equipment needs.     Plan    Occupational Therapy Initial Treatment Plan   Duration: Discharge Needs Only    DC Equipment Recommendations: Tub / Shower Seat  Discharge Recommendations: Recommend home health for continued occupational therapy services     Subjective    \"Because of my CP, I have found a lot of ways to adapt to do all the things I need to do.\"      Objective      Prior Living Situation   Prior Services Home-Independent   Housing / Facility 1 Story House "   Steps Into Home 1   Steps In Home 0   Bathroom Set up Walk In Shower  (Reports that they have a tub/shower but are in the works of converting it to a walk-in shower)   Equipment Owned Grab Bar(s) By Toilet   Lives with - Patient's Self Care Capacity Spouse;Child Less than 18 Years of Age   Comments Pt currently resides in [place] with her  and 16 y.o adoptive son. Pt reports that her  is disabled and unable to provide physical assist. Pt reports that due to her CP she has had a lot of therapy in her life and has found adaptive/compensatory strateiges to complete ADLs/IADLs   Prior Level of ADL Function   Self Feeding Independent   Grooming / Hygiene Independent   Bathing Independent   Dressing Independent   Toileting Independent   Prior Level of IADL Function   Medication Management Independent   Laundry Independent   Kitchen Mobility Independent   Finances Dependent   Home Management Independent   Shopping Independent   Prior Level Of Mobility Independent Without Device in Community;Independent Without Device in Home   Driving / Transportation Driving Independent   History of Falls   History of Falls Yes   Date of Last Fall   (Related to admit, denies having any other falls)   Precautions   Precautions Fall Risk   Comments Hx of CP   Vitals   O2 Delivery Device None - Room Air   Vitals Comments No c/o dizziness during session   Pain 0 - 10 Group   Therapist Pain Assessment Post Activity Pain Same as Prior to Activity;Nurse Notified  (Not rated, reported an increase in)   Cognition    Level of Consciousness Alert   Comments Pleasant, cooperative, and receptive to education   Active ROM Upper Body   Active ROM Upper Body  X   Dominant Hand Left   Comments LUE WFL; pt naturally rests RUE in a internally rotated, flexed position with composite fist due to CP. Pt is able to extend digits 1-3 in R hand, able to flex elbow, but has difficulty with elbow extension, and R shoulder flexion abduction  limited to 80 degrees   Strength Upper Body   Upper Body Strength  X   Comments LUE WFL; impaired RUE strength due to hx of CP   Coordination Upper Body   Coordination X   Comments LUE WFL; impaired gross motor coordination in RUE   Balance Assessment   Sitting Balance (Static) Fair +   Sitting Balance (Dynamic) Fair   Standing Balance (Static) Fair   Standing Balance (Dynamic) Fair   Weight Shift Sitting Good   Weight Shift Standing Fair   Comments No AD, no LOB   Bed Mobility    Supine to Sit Supervised   Sit to Supine Supervised   Scooting Supervised   Rolling Supervised   Comments HOB slightly elevated   ADL Assessment   Eating Supervision   Grooming Standby Assist;Standing  (Oral care and face washing)   Lower Body Dressing Supervision   Toileting Standby Assist   How much help from another person does the patient currently need...   6 Clicks Daily Activity Score 19   Functional Mobility   Sit to Stand Supervised   Bed, Chair, Wheelchair Transfer Standby Assist   Toilet Transfers Standby Assist   Mobility Functional mobility in room and hallway (to assess endurance for household tasks), no AD, no LOB   Activity Tolerance   Comments Functional for self care ADLs, no overt signs of WOB or fatigue   Short Term Goals   Short Term Goal # 1 Pt will have no further acute OT needs   Education Group   Education Provided Home Safety;Role of Occupational Therapist;Activities of Daily Living;Pathology of bedrest   Role of Occupational Therapist Patient Response Patient;Acceptance;Explanation;Verbal Demonstration   Home Safety Patient Response Patient;Acceptance;Explanation;Verbal Demonstration  (Recommend shower chair and having supervision when stepping in/out of shower to reduce risk of falls)   ADL Patient Response Patient;Acceptance;Explanation;Verbal Demonstration  (Compensatory strategies to safely complete ADLs)   Pathology of Bedrest Patient Response Patient;Acceptance;Explanation;Verbal Demonstration;Action  Demonstration

## 2024-09-04 NOTE — DISCHARGE SUMMARY
Discharge Summary    CHIEF COMPLAINT ON ADMISSION  Chief Complaint   Patient presents with    T-5000 GLF       Reason for Admission  Traumatic SAH     Admission Date  9/2/2024    CODE STATUS  Full Code    HPI & HOSPITAL COURSE  This is a 78 y.o. female here with T-5000 GLF  Please review Dr. Francisco Mantilla M.D. notes for further details of history of present illness, past medical/social/family histories, allergies and medications. Please review Dr. Mueller, Trauma consultation notes.  79yo M w/ h/o cerebral palsy and hypothyroidism admitted 9/2/2024 as a transfer from [facility] for small traumatic subarachnoid hemorrhage. She first presented with syncope and fall. Happened in her garden where she felt dizzy then lost consciousness. Headstrike and fell on the R side of her body. At [facility], WBC 15.9, INR 1, Cr- 0.8. CT head showed trace  SAH. Transfereed to [facility], Dr. Mueller, Trauma reviewed, felt TBI pose minimal risk, repeat head CT showed trace SAH, no neurosurgeon necessary; recommended admission to hospitalist service for syncopal work-up. At [facility], afebrile, hemodynamically stable. Neurologic exam stable mentation intact. Trauma surgeon cleared her from a truama perspective and signed off. Because patient is back to her usual mentation and neurologic state, no murmur on auscultation no further need for workup for syncope, no need for echo. On chart review,2022 echo normal EF, no aortic stenosis. Home Health Care arranged.     At discharge date, Letha Barbour afebrile and hemodynamically stable.  Letha Barbour wanted to be discharged today.    Imaging  CT-CSPINE WITHOUT PLUS RECONS   Final Result         1. No acute fracture from C1 through T1 is visualized.         DX-CHEST-LIMITED (1 VIEW)   Final Result      No evidence of acute cardiopulmonary process.      DX-SHOULDER 2+ RIGHT   Final Result      Post right shoulder arthroplasty. No evidence of fracture or dislocation of the  arthroplasty components.      OUTSIDE IMAGES-CT HEAD   Final Result                Therefore, she is discharged in fair and stable condition to home with organized home healthcare and close outpatient follow-up.    The patient met 2-midnight criteria for an inpatient stay at the time of discharge.    Discharge Date  9/4/2024    FOLLOW UP ITEMS POST DISCHARGE      DISCHARGE DIAGNOSES  Principal Problem:    SAH (subarachnoid hemorrhage) (HCC) (POA: Yes)  Active Problems:    Cerebral palsy (HCC) (POA: Yes)    Acquired hypothyroidism (POA: Yes)    Syncope and collapse (POA: Yes)      FOLLOW UP  Future Appointments   Date Time Provider Department Center   9/26/2024 10:40 AM KEZIA Shankar [facility] None     No follow-up provider specified.  Follow up with NATALIA Reveles 1 week  Follow up with Neurology for cerebral palsy and continued syncope work-up in 1 week  NURSING provide resources/pamphlets for  Fall mitigation (avoid hypotension, hypoglycemia, dehydration), Check and record blood pressures at home at least twice a day for primary provider to review)  MEDICATIONS ON DISCHARGE     Medication List        START taking these medications        Instructions   acetaminophen 325 MG Tabs  Commonly known as: Tylenol   Take 2 Tablets by mouth every 6 hours as needed for Mild Pain, Moderate Pain or Fever.  Dose: 650 mg     lidocaine 4 % Ptch  Start taking on: September 5, 2024  Commonly known as: Asperflex   Place 1 Patch on the skin every 24 hours.  Dose: 1 Patch     multivitamin Tabs   Take 1 Tablet by mouth every day.  Dose: 1 Tablet     thiamine 100 MG tablet  Commonly known as: Thiamine   Take 1 Tablet by mouth every day.  Dose: 100 mg            CONTINUE taking these medications        Instructions   fish oil 1000 MG Caps capsule   Take 1,000 mg by mouth at bedtime.  Dose: 1,000 mg     levothyroxine 88 MCG Tabs  Commonly known as: Synthroid   Take 88 mcg by mouth every morning on an empty stomach.  Dose: 88  mcg     liothyronine 5 MCG Tabs  Commonly known as: Cytomel   Take 2.5 mcg by mouth every morning.  Dose: 2.5 mcg     MAGNESIUM PO   Take 1 Tablet by mouth at bedtime.  Dose: 1 Tablet     oxybutynin SR 10 MG CR tablet  Commonly known as: Ditropan-XL   Take 10 mg by mouth at bedtime.  Dose: 10 mg     ZINC PO   Take 1 Tablet by mouth at bedtime.  Dose: 1 Tablet              Allergies  Allergies   Allergen Reactions    Bee Venom Swelling     anaphylaxis    Shellfish Allergy Swelling       DIET  Orders Placed This Encounter   Procedures    Diet Order Diet: Cardiac     Standing Status:   Standing     Number of Occurrences:   1     Order Specific Question:   Diet:     Answer:   Cardiac [6]       ACTIVITY  As per [facility]    CONSULTATIONS  Trauma    PROCEDURES      LABORATORY  Lab Results   Component Value Date    SODIUM 142 09/03/2024    POTASSIUM 3.5 (L) 09/03/2024    CHLORIDE 111 09/03/2024    CO2 20 09/03/2024    GLUCOSE 100 (H) 09/03/2024    BUN 14 09/03/2024    CREATININE 0.62 09/03/2024    GLOMRATE 100 02/07/2022        Lab Results   Component Value Date    WBC 8.7 09/03/2024    HEMOGLOBIN 12.3 09/03/2024    HEMATOCRIT 36.1 (L) 09/03/2024    PLATELETCT 262 09/03/2024        Total time of the discharge process exceeds 40 minutes.

## 2024-09-04 NOTE — THERAPY
Speech Language Pathology   Cognitive Evaluation     Patient Name: Letha Barbour  AGE:  78 y.o., SEX:  female  Medical Record #: [id number]  Date of Service: 9/4/2024      History of Present Illness    78 y.o. female who presented from OSH on 9/2 with a mechanical fall. Pt undergoing work up for potential syncope. Not seen by SLP before at [facility].    CT at outside hospital showing left temporal subarachnoid hemorrhage.    PMHx: cerebral palsy, arthritis      General Information  Vitals  O2 (LPM): 0  O2 Delivery Device: None - Room Air  Level of Consciousness: Alert  Orientation: Oriented x 4  Follows Directives: Yes      Prior Living Situation & Level of Function  Lives with - Patient's Self Care Capacity: Spouse, Child Less than 18 Years of Age  Comments: Pt is an adoptive mom to a 17 y/o son  Communication: WFL           Subjective  Pt awake and agreeable to therapy.         Assessment  Pt seen on this date for a cognitive-linguistic evaluation. Portions of the Cognistat (The Neurobehavioral Cognitive Status Examination) were presented to the pt and they received the following scores:    Orientation: WNL  Attention: WNL  Repetition (Language):WNL  Memory:Mild - Moderate 7  Calculations:WNL  Similarities (Reasoning):WNL  Judgment (Reasoning):WNL    Non-standardized measures were used to assess other cognitive domains and moderate deficits found in medication management and mild deficits found in following 3-step directives. Pt endorsed that she only takes a thyroid medication in the morning and supplements at night which RN endorsed was correct. Pt with correct dictation of time during clock drawing task but misplacement of hands initially with independent self correction.  Pt endorsed that she lives with spouse and 17 y/o adoptive son. She endorsed that she manages her own medications independently and drives while her spouse manages their finances. Pt endorsed not being at her cognitive baseline so SLP to  "follow for therapy in the acute care setting.     Of note, cognitive-linguistic evaluations assess performance on various cognitive-linguistic domains such as expressive and receptive language, attention, memory, executive function, problem solving, etc. It is not within the scope of Speech Language Pathologists to determine capacity, please defer to medical team or psych for capacity evaluation. Thank you.           Clinical Impressions  Deficits found in memory and medication management which pt endorsed is an acute change from baseline. SLP to follow while in house and recommend  SLP services targeting these deficits upon d/c.      Recommendations  Supervision Needs Upons Discharge: Intermittent assistance with IADLs (see below)  IADLs: Medication management, Financial management, Appointment management (driving)         SLP Treatment Plan  Treatment Plan: Cognitive Treatment  SLP Frequency: 3x Per Week  Estimated Duration: Until Therapy Goals Met      Anticipated Discharge Needs  Discharge Recommendations: Recommend home health for continued speech therapy services  Therapy Recommendations Upon DC: Cognitive-Linguistic Training, Community Re-Integration, Patient / Family / Caregiver Education      Patient / Family Goals  Patient / Family Goal #1: \"my head isn't working\"  Short Term Goal # 1: Pt will complete functional medication management tasks with >80% accuracy and min clinician cues  Short Term Goal # 2: Pt will complete functional memory tasks with >80% accuracy and min clinician cues      SHOSHANA Mazariegos  "

## 2024-09-04 NOTE — THERAPY
Physical Therapy   Initial Evaluation     Patient Name: Letha Barbour  Age:  78 y.o., Sex:  female  Medical Record #: [id number]  Today's Date: 9/4/2024     Precautions  Precautions: Swallow Precautions  Comments: HX of CP    Assessment  Patient is 78 y.o. female with  left temporal subarachnoid hemorrhage.  She had a fall earlier today while going out to her garden, uncertain if this fall was mechanical due to loss of balance, or may have been due to syncope.  Likely heat / orthostatic due to recent COVID infection.  Hx of Cerebral palsy, arthritis, hypothyroid, Chronic contractures of the right hand/wrist and ankles.  Pt lives with spouse and adopted son. Spouse has cancer. Pt stated that she is very motivated to stay active.     Assisted pt with donning shoes, pt stated that she walks better in her shoes. Pt with a PT clinical presentation of Evolving and anticipate that pt will benefit from outpatient physical therapy upon DC from hospital. Pt with chronic gait deviations. She stated previous fall to this current episode. Education on fall prevention and safety. Pt will also benefit from nursing to assist pt with mobility to include the following: up to chair for meals, ambulate to bathroom as needed, ambulate in halls, and Wrote information on patient's dry erase board and notified RN..      Plan    Physical Therapy Initial Treatment Plan   Duration: Evaluation only       Discharge Recommendations: Recommend outpatient physical therapy services to address higher level deficits (pt may benefit from high level balance therapy for fall prevention)       Subjective    Pt motivated for therapy.      Objective       09/04/24 0927   Time In/Time Out   Therapy Start Time 0910   Therapy End Time 0927   Total Therapy Time 17   Initial Contact Note    Initial Contact Note Order Received and Verified, Evaluation Only - Patient Does Not Require Further Acute Physical Therapy at this Time.  However, May Benefit from Post  Acute Therapy for Higher Level Functional Deficits.   Precautions   Precautions Fall Risk;Swallow Precautions   Comments HX of CP   Vitals   O2 Delivery Device None - Room Air   Vitals Comments no s/s distress or dizziness   Pain 0 - 10 Group   Location Head;Shoulder   Location Orientation Right   Therapist Pain Assessment During Activity;Post Activity Pain Same as Prior to Activity  (RN put lidocaine patch on pt prior to session)   Prior Living Situation   Prior Services Home-Independent   Housing / Facility 1 Story House   Steps Into Home 1   Steps In Home 0   Rail Left Rail  (Steps into Home)   Lives with - Patient's Self Care Capacity Spouse;Child Less than 18 Years of Age   Comments pt lives with adopted 15 y/o son and spouse, spouse andreia has cancer and cannot assist pt.   Prior Level of Functional Mobility   Bed Mobility Independent   Transfer Status Independent   Ambulation Independent   Ambulation Distance community   Assistive Devices Used None   History of Falls   History of Falls Yes   Date of Last Fall 09/02/24  (pt stated she had a previous fall recently when walking with her friend)   Cognition    Level of Consciousness Alert   Comments pleasant, cooperative, motivated   Active ROM Lower Body    Active ROM Lower Body  X   Comments chronic plantar flexion contracture with toe flexion contracture R foot   Strength Lower Body   Lower Body Strength  Not Tested   Comments pt with min chronic spastic R LE due to hx of CP, strength is WFL   Coordination Lower Body    Comments WFL for her mobility   Balance Assessment   Sitting Balance (Static) Fair +   Sitting Balance (Dynamic) Fair +   Standing Balance (Static) Fair +   Standing Balance (Dynamic) Fair   Weight Shift Sitting Good   Weight Shift Standing Good  (compensated, pt tends to accepts weight onto R toes due to PF contracture)   Comments No AD, No LOB   Bed Mobility    Comments up in chair upon arrival and exit   Gait Analysis   Gait Level Of  Assist Supervised   Assistive Device None   Distance (Feet) 350   # of Times Distance was Traveled 1   Deviation   (pt with chronic R Knee flexion, plantar flexion gait, compensates well)   # of Stairs Climbed 3  (with rail)   Level of Assist with Stairs Supervised   Functional Mobility   Sit to Stand Supervised   Bed, Chair, Wheelchair Transfer Supervised   Toilet Transfers Supervised   Transfer Method Stand Step   6 Clicks Assessment - How much HELP from from another person do you currently need... (If the patient hasn't done an activity recently, how much help from another person do you think he/she would need if he/she tried?)   Turning from your back to your side while in a flat bed without using bedrails? 3   Moving from lying on your back to sitting on the side of a flat bed without using bedrails? 3   Moving to and from a bed to a chair (including a wheelchair)? 3   Standing up from a chair using your arms (e.g., wheelchair, or bedside chair)? 3   Walking in hospital room? 3   Climbing 3-5 steps with a railing? 3   6 clicks Mobility Score 18   Activity Tolerance   Comments no limitations   Physical Therapy Initial Treatment Plan    Duration Evaluation only   Anticipated Discharge Equipment and Recommendations   Discharge Recommendations Recommend outpatient physical therapy services to address higher level deficits  (pt may benefit from high level balance therapy for fall prevention)   Interdisciplinary Plan of Care Collaboration   IDT Collaboration with  Nursing   Patient Position at End of Therapy Seated;Chair Alarm On;Call Light within Reach;Tray Table within Reach;Phone within Reach   Collaboration Comments RN updated

## 2024-09-04 NOTE — PROGRESS NOTES
Pt discharged from unit. All belongings with pt. PIV removed prior to DCL. Discharge paperwork reviewed with patient, all questions answered, and paperwork signed. One copy with patient, and one copy kept to be scanned into patient's chart.   M2B delivered and reviewed.

## 2024-09-04 NOTE — PROGRESS NOTES
Hospital Medicine Daily Progress Note    Date of Service  9/3/2024    Chief Complaint  Letha Barbour is a 78 y.o. female with cerebral palsy and hypothyroidism admitted 9/2/2024 with syncope and SAH.    Hospital Course  No notes on file    Interval Problem Update    MARYCHUY ON  Reports intermittent headache.  Primary concern is Right shoulder pain.  Agreeable to lidoderm patch.  No NSAIDs due to ?SAH.  Orthostatics negative.  Additional telemetry monitoring and neurochecks today for expansion of SAH.  Denies dyspnea, hemoptysis, chest pain.    CBC reviewed, normal.  BMP reviewed, normal.    I have discussed this patient's plan of care and discharge plan at IDT rounds today with Case Management, Nursing, Nursing leadership, and other members of the IDT team.    Consultants/Specialty  None    Code Status  Full Code    Disposition  The patient is not medically cleared for discharge to home or a post-acute facility.  Anticipate discharge to: home with close outpatient follow-up    I have placed the appropriate orders for post-discharge needs.    Review of Systems  Review of Systems   Respiratory:  Negative for hemoptysis and shortness of breath.    Cardiovascular:  Negative for chest pain.   Musculoskeletal:  Positive for joint pain.   Neurological:  Positive for headaches. Negative for dizziness.        Physical Exam  Temp:  [36.3 °C (97.3 °F)-37 °C (98.6 °F)] 37 °C (98.6 °F)  Pulse:  [58-86] 63  Resp:  [12-20] 17  BP: (104-148)/(53-83) 130/71  SpO2:  [92 %-97 %] 94 %    Physical Exam  Vitals and nursing note reviewed.   Constitutional:       General: She is not in acute distress.     Appearance: She is not ill-appearing, toxic-appearing or diaphoretic.   HENT:      Head: Normocephalic.      Nose: Nose normal.      Mouth/Throat:      Mouth: Mucous membranes are moist.   Eyes:      General: No scleral icterus.     Conjunctiva/sclera: Conjunctivae normal.   Cardiovascular:      Rate and Rhythm: Normal rate and regular  rhythm.      Pulses: Normal pulses.      Heart sounds: Normal heart sounds. No murmur heard.     No friction rub. No gallop.   Pulmonary:      Effort: Pulmonary effort is normal. No respiratory distress.      Breath sounds: Normal breath sounds. No wheezing, rhonchi or rales.   Abdominal:      General: Abdomen is flat. Bowel sounds are normal. There is no distension.      Palpations: Abdomen is soft.      Tenderness: There is no abdominal tenderness. There is no guarding or rebound.   Genitourinary:     Comments: No seals  Musculoskeletal:         General: Deformity (RUE hand contracture) present.      Cervical back: Neck supple.   Skin:     General: Skin is warm and dry.   Neurological:      Mental Status: She is alert.      Cranial Nerves: No cranial nerve deficit (II-XII grossly intact).      Motor: Weakness (4/5 RUE flexion / extension, 5/5 LUE and BLE flexion / extension) present.      Comments: Appropriately conversant   Psychiatric:         Mood and Affect: Mood normal.         Behavior: Behavior normal.         Thought Content: Thought content normal.         Judgment: Judgment normal.         Fluids    Intake/Output Summary (Last 24 hours) at 9/3/2024 2106  Last data filed at 9/3/2024 2000  Gross per 24 hour   Intake 0 ml   Output --   Net 0 ml        Laboratory  Recent Labs     09/02/24  1352 09/03/24  0437   WBC  --  8.7   RBC 4.75 4.13*   HEMOGLOBIN 13.7 12.3   HEMATOCRIT 40.5 36.1*   MCV 85.2 87.4   MCH 29.0 29.8   MCHC 34.0 34.1   RDW 13.4 41.1   PLATELETCT 318 262   MPV 7.6 9.4     Recent Labs     09/03/24  0437   SODIUM 142   POTASSIUM 3.5*   CHLORIDE 111   CO2 20   GLUCOSE 100*   BUN 14   CREATININE 0.62   CALCIUM 8.4*                   Imaging  CT-CSPINE WITHOUT PLUS RECONS   Final Result         1. No acute fracture from C1 through T1 is visualized.         DX-CHEST-LIMITED (1 VIEW)   Final Result      No evidence of acute cardiopulmonary process.      DX-SHOULDER 2+ RIGHT   Final Result      Post  right shoulder arthroplasty. No evidence of fracture or dislocation of the arthroplasty components.      OUTSIDE IMAGES-CT HEAD   Final Result           Assessment/Plan  * SAH (subarachnoid hemorrhage) (HCC)- (present on admission)  Assessment & Plan  CT at outside hospital showing left temporal subarachnoid hemorrhage.    Trauma surgery consulted and s/o, no intervention warranted  Neurochecks q4h, repeat CTH if interval neurologic deficit  Avoid aspirin/NSAIDs/anticoagulant medication      Syncope and collapse- (present on admission)  Assessment & Plan  Reported loss of consciousness in garden without prodrome  Likely heat / orthostatic due to recent COVID infection  No murmur nor exertional symptoms to suggest cardiogenic - TTE deferred  CTH demonstrated SAH unlikely as primary cause of LOC  Telemetry    Acquired hypothyroidism- (present on admission)  Assessment & Plan  Resume home meds    Cerebral palsy (HCC)- (present on admission)  Assessment & Plan  With chronic contractures       VTE prophylaxis:   SCDs/TEDs   pharmacologic prophylaxis contraindicated due to SAH    I have performed a physical exam and reviewed and updated ROS and Plan today (9/3/2024). In review of yesterday's note (9/2/2024), there are no changes except as documented above.
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
## 2024-09-04 NOTE — ASSESSMENT & PLAN NOTE
Reported loss of consciousness in garden without prodrome  Likely heat / orthostatic due to recent COVID infection  No murmur nor exertional symptoms to suggest cardiogenic - TTE deferred  CTH demonstrated SAH unlikely as primary cause of LOC  Telemetry

## 2024-09-05 ENCOUNTER — HOME HEALTH ADMISSION (OUTPATIENT)
Dept: HOME HEALTH SERVICES | Facility: HOME HEALTHCARE | Age: 79
End: 2024-09-05
Payer: MEDICARE

## 2024-09-05 NOTE — DISCHARGE PLANNING
Good morning,  This referral has been escalated to a Clinical Supervisor for review in order to determine Home Health appropriateness. Referral is also pending insurance authorization. These issues will be resolved as quickly as possible. Thank you!

## 2024-09-05 NOTE — DISCHARGE PLANNING
ATTN: Case Management  RE: Referral for Home Health    As of 9/5/2024, we have accepted the Home Health referral for the patient listed above.    A [facility] clinician will be out to see the patient within 48 hours. If you have any questions or concerns regarding the patient’s transition to Home Health, please do not hesitate to contact us at [phone].      We look forward to collaborating with you,  [facility] Team

## 2024-09-10 ENCOUNTER — HOME CARE VISIT (OUTPATIENT)
Dept: HOME HEALTH SERVICES | Facility: HOME HEALTHCARE | Age: 79
End: 2024-09-10

## 2024-09-20 ENCOUNTER — OFFICE VISIT (OUTPATIENT)
Dept: URGENT CARE | Facility: CLINIC | Age: 79
End: 2024-09-20
Payer: MEDICARE

## 2024-09-20 VITALS
DIASTOLIC BLOOD PRESSURE: 72 MMHG | BODY MASS INDEX: 25.95 KG/M2 | RESPIRATION RATE: 14 BRPM | WEIGHT: 152 LBS | TEMPERATURE: 97.3 F | SYSTOLIC BLOOD PRESSURE: 122 MMHG | HEART RATE: 80 BPM | HEIGHT: 64 IN | OXYGEN SATURATION: 95 %

## 2024-09-20 DIAGNOSIS — M25.511 ACUTE PAIN OF RIGHT SHOULDER: ICD-10-CM

## 2024-09-20 DIAGNOSIS — R55 SYNCOPE, UNSPECIFIED SYNCOPE TYPE: ICD-10-CM

## 2024-09-20 DIAGNOSIS — I60.9 SAH (SUBARACHNOID HEMORRHAGE) (HCC): ICD-10-CM

## 2024-09-20 PROCEDURE — 3074F SYST BP LT 130 MM HG: CPT | Performed by: PHYSICIAN ASSISTANT

## 2024-09-20 PROCEDURE — 3078F DIAST BP <80 MM HG: CPT | Performed by: PHYSICIAN ASSISTANT

## 2024-09-20 PROCEDURE — 99203 OFFICE O/P NEW LOW 30 MIN: CPT | Performed by: PHYSICIAN ASSISTANT

## 2024-09-20 ASSESSMENT — ENCOUNTER SYMPTOMS
DIZZINESS: 0
VOMITING: 0
WEAKNESS: 0
HEADACHES: 1
CHILLS: 0
SENSORY CHANGE: 0
DOUBLE VISION: 0
TINGLING: 0
FOCAL WEAKNESS: 0
NAUSEA: 0
BLURRED VISION: 0
FEVER: 0
COUGH: 0
SHORTNESS OF BREATH: 0

## 2024-09-20 NOTE — PROGRESS NOTES
Subjective     Letha Barbour is a 79 y.o. female who presents with Fall (Pt states took a bad fall x 9/2, recently hospitalized, brain bleed, still developed pain, shoulder replacement on R shoulder )            Patient was recently hospitalized on 9/2/24 after syncope and Subarachnoid hemorrhage of left brain after the fall. She was admitted to [facility] and underwent a syncopal work-up. The patient could not get in to her PCP for a follow-up so she presented here. She hurt her shoulder in the fall. X-ray of the shoulder in the hospital came back negative. Patient has history of right shoulder arthroplasty and total replacement. She has a history of Cerebral palsy and chronic right shoulder weakness.  She was consulted by Neuro and discharged without intervention as the hemorrhage was very small. She is here to make sure she is doing everything necessary to recover. She continues to experience significant fatigue, dull headaches and right shoulder pain. OTC remedies are controlling her pain/ She denies any worsening headaches, dizziness, vision changes, weakness, numbness or paralysis.       Past Medical History:   Diagnosis Date    Arthritis     Cerebral palsy (HCC)     Dental disorder     peridontal disease    Migraine     Pain     right hip,right shoulder,pain scale 7    Psychiatric problem     anxiety and depression    Snoring     Transfusion history     Unspecified disorder of thyroid     hypothyroid       Past Surgical History:   Procedure Laterality Date    SHOULDER ARTHROPLASTY TOTAL REVERSED Right 9/20/2018    Procedure: RT SHOULDER ARTHROPLASTY TOTAL REVERSE;  Surgeon: Mariano Ballesteros M.D.;  Location: [facility];  Service: Orthopedics    HIP ARTHROPLASTY TOTAL  10/1/2009    Performed by JAIME BRYANT at SURGERY [facility]    TENOTOMY  10/1/2009    Performed by JAIME BRYANT at SURGERY [facility]    OTHER  1968    right foot tendon lengthening     TONSILLECTOMY   "1956         Family History   Problem Relation Age of Onset    Alzheimer's Disease Father      Allergies:  Bee venom and Shellfish allergy      Medications, Allergies, and current problem list reviewed today in Epic    Review of Systems   Constitutional:  Negative for chills, fever and malaise/fatigue.   Eyes:  Negative for blurred vision and double vision.   Respiratory:  Negative for cough and shortness of breath.    Gastrointestinal:  Negative for nausea and vomiting.   Musculoskeletal:  Positive for joint pain (right shoulder).   Skin:  Negative for rash.   Neurological:  Positive for headaches. Negative for dizziness, tingling, sensory change, focal weakness and weakness.     All other systems reviewed and are negative.            Objective     /72 (BP Location: Right arm, Patient Position: Sitting, BP Cuff Size: Adult long)   Pulse 80   Temp 36.3 °C (97.3 °F) (Temporal)   Resp 14   Ht 1.626 m (5' 4\")   Wt 68.9 kg (152 lb)   SpO2 95%   BMI 26.09 kg/m²      Physical Exam  Constitutional:       General: She is not in acute distress.     Appearance: She is not ill-appearing.   HENT:      Head: Normocephalic and atraumatic.   Eyes:      Conjunctiva/sclera: Conjunctivae normal.   Cardiovascular:      Rate and Rhythm: Normal rate and regular rhythm.   Pulmonary:      Effort: Pulmonary effort is normal. No respiratory distress.      Breath sounds: No stridor. No wheezing.   Chest:      Chest wall: Tenderness (mild TTP right chest wall. no retraction or crepitus) present.   Musculoskeletal:      Right shoulder: No swelling, deformity, tenderness or bony tenderness. Decreased range of motion (Tenderness with abduction. Limited abduction- almost to baseline per patient.).   Skin:     General: Skin is warm and dry.   Neurological:      General: No focal deficit present.      Mental Status: She is alert and oriented to person, place, and time.   Psychiatric:         Mood and Affect: Mood normal.         " Behavior: Behavior normal.         Thought Content: Thought content normal.         Judgment: Judgment normal.                             Assessment & Plan        Assessment & Plan  Acute pain of right shoulder    Orders:    Referral to Physical Therapy    SAH (subarachnoid hemorrhage) (HCC)         Syncope, unspecified syncope type       Patient is slowly improving.   RICE. Continue OTC Tylenol  PT for right shoulder.  Rest. Reassurance given that SAH can take time to improve and mild headaches/fatigue is normal after SAH.  As long as no worsening symptoms or neurological symptoms develop she should be okay.   Patient given appointment time and date to establish with new [facility] PCP on 10/09/24.        Differential diagnoses, Supportive care, and indications for immediate follow-up discussed with patient.   Pathogenesis of diagnosis discussed including typical length and natural progression.   Instructed to return to clinic or nearest emergency department for any change in condition, further concerns, or worsening of symptoms.      The patient demonstrated a good understanding and agreed with the treatment plan.      Janene Mireles P.A.-C.

## 2024-10-09 ENCOUNTER — OFFICE VISIT (OUTPATIENT)
Dept: MEDICAL GROUP | Facility: PHYSICIAN GROUP | Age: 79
End: 2024-10-09
Payer: MEDICARE

## 2024-10-09 VITALS
TEMPERATURE: 97.1 F | HEIGHT: 64 IN | DIASTOLIC BLOOD PRESSURE: 72 MMHG | HEART RATE: 79 BPM | RESPIRATION RATE: 14 BRPM | OXYGEN SATURATION: 94 % | BODY MASS INDEX: 24.84 KG/M2 | WEIGHT: 145.5 LBS | SYSTOLIC BLOOD PRESSURE: 120 MMHG

## 2024-10-09 DIAGNOSIS — E03.9 ACQUIRED HYPOTHYROIDISM: ICD-10-CM

## 2024-10-09 DIAGNOSIS — M25.611 DECREASED ROM OF RIGHT SHOULDER: ICD-10-CM

## 2024-10-09 DIAGNOSIS — I60.9 SAH (SUBARACHNOID HEMORRHAGE) (HCC): ICD-10-CM

## 2024-10-09 DIAGNOSIS — R07.89 OTHER CHEST PAIN: ICD-10-CM

## 2024-10-09 DIAGNOSIS — G80.9 CEREBRAL PALSY, UNSPECIFIED TYPE (HCC): ICD-10-CM

## 2024-10-09 DIAGNOSIS — N39.41 URGE INCONTINENCE OF URINE: ICD-10-CM

## 2024-10-09 PROCEDURE — 3078F DIAST BP <80 MM HG: CPT | Performed by: STUDENT IN AN ORGANIZED HEALTH CARE EDUCATION/TRAINING PROGRAM

## 2024-10-09 PROCEDURE — 99213 OFFICE O/P EST LOW 20 MIN: CPT | Performed by: STUDENT IN AN ORGANIZED HEALTH CARE EDUCATION/TRAINING PROGRAM

## 2024-10-09 PROCEDURE — 3074F SYST BP LT 130 MM HG: CPT | Performed by: STUDENT IN AN ORGANIZED HEALTH CARE EDUCATION/TRAINING PROGRAM

## 2024-10-09 RX ORDER — CLOBETASOL PROPIONATE 0.5 MG/G
OINTMENT TOPICAL
COMMUNITY

## 2024-10-09 ASSESSMENT — PATIENT HEALTH QUESTIONNAIRE - PHQ9: CLINICAL INTERPRETATION OF PHQ2 SCORE: 0

## 2024-11-12 ENCOUNTER — OFFICE VISIT (OUTPATIENT)
Dept: MEDICAL GROUP | Facility: PHYSICIAN GROUP | Age: 79
End: 2024-11-12
Payer: MEDICARE

## 2024-11-12 VITALS
TEMPERATURE: 97.4 F | BODY MASS INDEX: 24.88 KG/M2 | HEART RATE: 89 BPM | DIASTOLIC BLOOD PRESSURE: 54 MMHG | HEIGHT: 64 IN | OXYGEN SATURATION: 93 % | SYSTOLIC BLOOD PRESSURE: 98 MMHG | RESPIRATION RATE: 14 BRPM | WEIGHT: 145.72 LBS

## 2024-11-12 DIAGNOSIS — F32.A MILD DEPRESSION: ICD-10-CM

## 2024-11-12 DIAGNOSIS — M81.0 AGE-RELATED OSTEOPOROSIS WITHOUT CURRENT PATHOLOGICAL FRACTURE: ICD-10-CM

## 2024-11-12 PROCEDURE — 3078F DIAST BP <80 MM HG: CPT | Performed by: STUDENT IN AN ORGANIZED HEALTH CARE EDUCATION/TRAINING PROGRAM

## 2024-11-12 PROCEDURE — 3074F SYST BP LT 130 MM HG: CPT | Performed by: STUDENT IN AN ORGANIZED HEALTH CARE EDUCATION/TRAINING PROGRAM

## 2024-11-12 PROCEDURE — G0439 PPPS, SUBSEQ VISIT: HCPCS | Performed by: STUDENT IN AN ORGANIZED HEALTH CARE EDUCATION/TRAINING PROGRAM

## 2024-11-12 ASSESSMENT — ACTIVITIES OF DAILY LIVING (ADL): BATHING_REQUIRES_ASSISTANCE: 0

## 2024-11-12 ASSESSMENT — ENCOUNTER SYMPTOMS: GENERAL WELL-BEING: FAIR

## 2024-11-12 ASSESSMENT — PATIENT HEALTH QUESTIONNAIRE - PHQ9
5. POOR APPETITE OR OVEREATING: 1 - SEVERAL DAYS
SUM OF ALL RESPONSES TO PHQ QUESTIONS 1-9: 5
CLINICAL INTERPRETATION OF PHQ2 SCORE: 1

## 2024-11-12 NOTE — PROGRESS NOTES
Chief Complaint   Patient presents with    Medicare Annual Wellness       HPI:  Letha Barbour is a 79 y.o. here for Medicare Annual Wellness Visit     Patient Active Problem List    Diagnosis Date Noted    Age-related osteoporosis without current pathological fracture 11/12/2024    Mild depression 11/12/2024    Urge incontinence of urine 10/09/2024    Other chest pain 10/09/2024    Syncope and collapse 09/03/2024    SAH (subarachnoid hemorrhage) ([facility]) 09/02/2024    Cerebral palsy ([facility]) 09/02/2024    Acquired hypothyroidism 09/02/2024       Current Outpatient Medications   Medication Sig Dispense Refill    Cholecalciferol (VITAMIN D3) 125 MCG (5000 UT) Cap Take 1 Tablet by mouth every day.      clobetasol (TEMOVATE) 0.05 % Ointment 30      Cyanocobalamin 3000 MCG/ML Liquid orally once a day      estradiol (ESTRACE) 0.1 MG/GM vaginal cream Insert 1 g into the vagina every Monday, Wednesday, and Friday. 42.5 g 5    multivitamin Tab Take 1 Tablet by mouth every day.      oxybutynin SR (DITROPAN-XL) 10 MG CR tablet Take 10 mg by mouth at bedtime. Indications: Urinary Incontinence      MAGNESIUM PO Take 1 Tablet by mouth at bedtime. Indications: supplement      Multiple Vitamins-Minerals (ZINC PO) Take 1 Tablet by mouth at bedtime. Indications: supplement      levothyroxine (SYNTHROID) 88 MCG Tab Take 88 mcg by mouth every morning on an empty stomach. Indications: Underactive Thyroid       No current facility-administered medications for this visit.          Current supplements as per medication list.     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 She has never used smokeless tobacco. She reports that she does not drink alcohol and does not use drugs.  Counseling given: Not Answered      ROS:    Gait: Uses no assistive device  Ostomy: No  Other tubes: No  Amputations: No  Chronic oxygen use: No  Last  eye exam: today  Wears hearing aids: Yes   : Reports urinary leakage during the last 6 months that has somewhat interfered with their daily activities or sleep. - sees urology     Screening:    Up to date per age related guidelines     Depression Screening  Little interest or pleasure in doing things?  0 - not at all  Feeling down, depressed , or hopeless? 1 - several days  Trouble falling or staying asleep, or sleeping too much?  0 - not at all  Feeling tired or having little energy?  1 - several days  Poor appetite or overeating?  1 - several days  Feeling bad about yourself - or that you are a failure or have let yourself or your family down? 1 - several days  Trouble concentrating on things, such as reading the newspaper or watching television? 1 - several days  Moving or speaking so slowly that other people could have noticed.  Or the opposite - being so fidgety or restless that you have been moving around a lot more than usual?  0 - not at all  Thoughts that you would be better off dead, or of hurting yourself?  0 - not at all  Patient Health Questionnaire Score: 5    If depressive symptoms identified deferred to follow up visit unless specifically addressed in assessment and plan.    Interpretation of PHQ-9 Total Score   Score Severity   1-4 No Depression   5-9 Mild Depression   10-14 Moderate Depression   15-19 Moderately Severe Depression   20-27 Severe Depression    Screening for Cognitive Impairment  Do you or any of your friends or family members have any concern about your memory? Yes  Three Minute Recall (Leader, Season, Table) 3/3    Rich clock face with all 12 numbers and set the hands to show 10 minutes after 11.  Yes    Cognitive concerns identified deferred for follow up unless specifically addressed in assessment and plan.    Fall Risk Assessment  Has the patient had two or more falls in the last year or any fall with injury in the last year?  No    Safety Assessment  Do you always wear your  seatbelt?  Yes  Any changes to home needed to function safely? No  Difficulty hearing.  Yes  Patient counseled about all safety risks that were identified.    Functional Assessment ADLs  Are there any barriers preventing you from cooking for yourself or meeting nutritional needs?  No.    Are there any barriers preventing you from driving safely or obtaining transportation?  No.    Are there any barriers preventing you from using a telephone or calling for help?  No    Are there any barriers preventing you from shopping?  No.    Are there any barriers preventing you from taking care of your own finances?  No    Are there any barriers preventing you from managing your medications?  No    Are there any barriers preventing you from showering, bathing or dressing yourself? No    Are there any barriers preventing you from doing housework or laundry? No    Are there any barriers preventing you from using the toilet?No    Are you currently engaging in any exercise or physical activity?  Yes.      Self-Assessment of Health  What is your perception of your health? Fair    Do you sleep more than six hours a night? No    In the past 7 days, how much did pain keep you from doing your normal work? None    Do you spend quality time with family or friends (virtually or in person)? No    Do you usually eat a heart healthy diet that constists of a variety of fruits, vegetables, whole grains and fiber? Yes    Do you eat foods high in fat and/or Fast Food more than three times per week? No    How concerned are you that your medical conditions are not being well managed? Not at all    Are you worried that in the next 2 months, you may not have stable housing that you own, rent, or stay in as part of a household? N       Advance Care Planning  Do you have an Advance Directive, Living Will, Durable Power of , or POLST? Reports need this updated       Health Maintenance Summary            Overdue - Hepatitis C Screening (Once)  Never done      No completion history exists for this topic.              Overdue - Polio Vaccine (Inactivated Polio) (2 of 3 - Adult catch-up series) Overdue since 2/21/2008 01/24/2008  Imm Admin: IPV              Overdue - COVID-19 Vaccine (1 - 2024-25 season) Never done      No completion history exists for this topic.              Postponed - Influenza Vaccine (1) Postponed until 10/9/2025      No completion history exists for this topic.              IMM DTaP/Tdap/Td Vaccine (2 - Td or Tdap) Next due on 3/24/2025      03/24/2015  Imm Admin: Tdap Vaccine    01/24/2008  Imm Admin: TD Vaccine              Annual Wellness Visit (Yearly) Next due on 11/12/2025 11/12/2024  Level of Service: OR ANNUAL WELLNESS VISIT-INCLUDES PPPS SUBSEQUE*              Bone Density Scan (Every 5 Years) Tentatively due on 11/8/2028 11/08/2023  DS-BONE DENSITY STUDY (DEXA)    09/27/2021  DS-BONE DENSITY STUDY (DEXA)    04/24/2009  DS-BONE DENSITY STUDY (DEXA)    04/18/2007  DS-BONE DENSITY STUDY (DEXA)              Hepatitis A Vaccine (Hep A) (Series Information) Aged Out      05/24/2010  Imm Admin: Hepatitis A Vaccine, Adult    01/24/2008  Imm Admin: Hepatitis A Vaccine, Adult              Meningococcal Immunization (Series Information) Aged Out      09/27/2019  Imm Admin: Meningococcal Conjugate Vaccine MCV4 (MENVEO)    01/31/2008  Imm Admin: Meningococcal Polysaccharide Vaccine MPSV4 - HISTORICAL DATA              Pneumococcal Vaccine: 65+ Years (Series Information) Completed      09/27/2019  Imm Admin: Pneumococcal polysaccharide vaccine (PPSV-23)    05/13/2015  Imm Admin: Pneumococcal Conjugate Vaccine (Prevnar/PCV-13)              Zoster (Shingles) Vaccines (Series Information) Completed      12/04/2019  Imm Admin: Zoster Vaccine Recombinant (RZV) (SHINGRIX)    08/21/2019  Imm Admin: Zoster Vaccine Recombinant (RZV) (SHINGRIX)              Hepatitis B Vaccine (Hep B) (Series Information) Completed      03/04/2020   Imm Admin: Hepatitis B Vaccine (Adol/Adult)    09/27/2019  Imm Admin: Hepatitis B Vaccine (Adol/Adult)    08/21/2019  Imm Admin: Hepatitis B Vaccine (Adol/Adult)              HPV Vaccines (Series Information) Aged Out      No completion history exists for this topic.              Discontinued - Mammogram  Discontinued        Frequency changed to Never automatically (Topic No Longer Applies)    07/25/2024  [id number]    07/25/2024  MA-SCREENING MAMMO BILAT W/TOMOSYNTHESIS W/CAD    06/27/2023  [id number]    05/17/2022  [id number]    Only the first 5 history entries have been loaded, but more history exists.              Discontinued - Colorectal Cancer Screening  Discontinued        Frequency changed to Never automatically (Topic No Longer Applies)    06/19/2024  COLONOSCOPY RESULTS    06/14/2024  COLONOSCOPY RESULTS                    Patient Care Team:  Georgia Cartagena D.O. as PCP - General (Family Medicine)      Social History     Tobacco Use    Smoking status: Never    Smokeless tobacco: Never   Vaping Use    Vaping status: Never Used   Substance Use Topics    Alcohol use: No    Drug use: No     Family History   Problem Relation Age of Onset    Alzheimer's Disease Father      She  has a past medical history of Arthritis, Cerebral palsy (HCC), Dental disorder, Migraine, Pain, Psychiatric problem, Snoring, Transfusion history, and Unspecified disorder of thyroid.   Past Surgical History:   Procedure Laterality Date    SHOULDER ARTHROPLASTY TOTAL REVERSED Right 9/20/2018    Procedure: RT SHOULDER ARTHROPLASTY TOTAL REVERSE;  Surgeon: Mariano Ballesteros M.D.;  Location: SURGERY [facility];  Service: Orthopedics    HIP ARTHROPLASTY TOTAL  10/1/2009    Performed by JAIME BRYANT at SURGERY [facility]    TENOTOMY  10/1/2009    Performed by JAIME BRYANT at SURGERY [facility]S    OTHER  1968    right foot tendon lengthening     TONSILLECTOMY  1956       Exam:  "  BP 98/54   Pulse 89   Temp 36.3 °C (97.4 °F) (Temporal)   Resp 14   Ht 1.626 m (5' 4\")   Wt 66.1 kg (145 lb 11.6 oz)   SpO2 93%  Body mass index is 25.01 kg/m².    Hearing good.    Dentition good  Alert, oriented in no acute distress.  Eye contact is good, speech goal directed, affect calm    Assessment and Plan. The following treatment and monitoring plan is recommended:        Problem List Items Addressed This Visit       Age-related osteoporosis without current pathological fracture     Managed by endo  Declines medical therapy at this time          Mild depression     Screened positive on screening today  Declines therapy and declines medication               Services suggested: No services needed at this time  Health Care Screening: Age-appropriate preventive services recommended by USPTF and ACIP covered by Medicare were discussed today. Services ordered if indicated and agreed upon by the patient.  Referrals offered: Community-based lifestyle interventions to reduce health risks and promote self-management and wellness, fall prevention, nutrition, physical activity, tobacco-use cessation, weight loss, and mental health services as per orders if indicated.    Discussion today about general wellness and lifestyle habits:    Prevent falls and reduce trip hazards; Cautioned about securing or removing rugs.  Have a working fire alarm and carbon monoxide detector;   Engage in regular physical activity and social activities     Follow-up: Return in about 3 months (around 2/12/2025), or if symptoms worsen or fail to improve.  "

## 2024-12-12 ENCOUNTER — OFFICE VISIT (OUTPATIENT)
Dept: MEDICAL GROUP | Facility: PHYSICIAN GROUP | Age: 79
End: 2024-12-12
Payer: MEDICARE

## 2024-12-12 VITALS
OXYGEN SATURATION: 95 % | SYSTOLIC BLOOD PRESSURE: 102 MMHG | RESPIRATION RATE: 14 BRPM | HEIGHT: 64 IN | TEMPERATURE: 96.7 F | HEART RATE: 73 BPM | DIASTOLIC BLOOD PRESSURE: 54 MMHG | BODY MASS INDEX: 24.43 KG/M2 | WEIGHT: 143.08 LBS

## 2024-12-12 DIAGNOSIS — M54.6 PAIN IN THORACIC SPINE: ICD-10-CM

## 2024-12-12 PROCEDURE — 3078F DIAST BP <80 MM HG: CPT | Performed by: STUDENT IN AN ORGANIZED HEALTH CARE EDUCATION/TRAINING PROGRAM

## 2024-12-12 PROCEDURE — 99213 OFFICE O/P EST LOW 20 MIN: CPT | Performed by: STUDENT IN AN ORGANIZED HEALTH CARE EDUCATION/TRAINING PROGRAM

## 2024-12-12 PROCEDURE — 3074F SYST BP LT 130 MM HG: CPT | Performed by: STUDENT IN AN ORGANIZED HEALTH CARE EDUCATION/TRAINING PROGRAM

## 2024-12-13 NOTE — PROGRESS NOTES
Verbal Consent given for JAIDA recording software    HISTORY OF PRESENT ILLNESS: Letha is a pleasant 79 y.o. female, established patient who presents today to discuss medical problems as listed below:    History of Present Illness  79-year-old female presents with bilateral upper back pain around the shoulder blades for the past week. No associated numbness or tingling. Concerned about a tumor due to 's recent lung cancer with brain metastasis. Reports a fall incident while working around the closet. Currently undergoing treatment for osteoporosis, which runs in her family. Seeks advice on fall prevention due to increased fall risk. Receiving physical therapy, but frequency decreased due to Medicare limitations.    FAMILY HISTORY  - Osteoporosis runs in family       Current Outpatient Medications Ordered in Epic   Medication Sig Dispense Refill    Cholecalciferol (VITAMIN D3) 125 MCG (5000 UT) Cap Take 1 Tablet by mouth every day.      clobetasol (TEMOVATE) 0.05 % Ointment 30      Cyanocobalamin 3000 MCG/ML Liquid orally once a day      estradiol (ESTRACE) 0.1 MG/GM vaginal cream Insert 1 g into the vagina every Monday, Wednesday, and Friday. 42.5 g 5    multivitamin Tab Take 1 Tablet by mouth every day.      oxybutynin SR (DITROPAN-XL) 10 MG CR tablet Take 10 mg by mouth at bedtime. Indications: Urinary Incontinence      MAGNESIUM PO Take 1 Tablet by mouth at bedtime. Indications: supplement      Multiple Vitamins-Minerals (ZINC PO) Take 1 Tablet by mouth at bedtime. Indications: supplement      levothyroxine (SYNTHROID) 88 MCG Tab Take 88 mcg by mouth every morning on an empty stomach. Indications: Underactive Thyroid       No current Epic-ordered facility-administered medications on file.       Review of systems:  Per HPI    Patient Active Problem List    Diagnosis Date Noted    Age-related osteoporosis without current pathological fracture 11/12/2024    Mild depression 11/12/2024    Urge incontinence of  urine 10/09/2024    Other chest pain 10/09/2024    Syncope and collapse 09/03/2024    SAH (subarachnoid hemorrhage) ([facility]) 09/02/2024    Cerebral palsy ([facility]) 09/02/2024    Acquired hypothyroidism 09/02/2024     Past Surgical History:   Procedure Laterality Date    SHOULDER ARTHROPLASTY TOTAL REVERSED Right 9/20/2018    Procedure: RT SHOULDER ARTHROPLASTY TOTAL REVERSE;  Surgeon: Mariano Ballesteros M.D.;  Location: SURGERY [facility];  Service: Orthopedics    HIP ARTHROPLASTY TOTAL  10/1/2009    Performed by JAIME BRYANT at SURGERY [facility]    TENOTOMY  10/1/2009    Performed by JAIME BRYANT at SURGERY [facility]    OTHER  1968    right foot tendon lengthening     TONSILLECTOMY  1956     Social History     Tobacco Use    Smoking status: Never    Smokeless tobacco: Never   Vaping Use    Vaping status: Never Used   Substance Use Topics    Alcohol use: No    Drug use: No      Family History   Problem Relation Age of Onset    Alzheimer's Disease Father      Current Outpatient Medications   Medication Sig Dispense Refill    Cholecalciferol (VITAMIN D3) 125 MCG (5000 UT) Cap Take 1 Tablet by mouth every day.      clobetasol (TEMOVATE) 0.05 % Ointment 30      Cyanocobalamin 3000 MCG/ML Liquid orally once a day      estradiol (ESTRACE) 0.1 MG/GM vaginal cream Insert 1 g into the vagina every Monday, Wednesday, and Friday. 42.5 g 5    multivitamin Tab Take 1 Tablet by mouth every day.      oxybutynin SR (DITROPAN-XL) 10 MG CR tablet Take 10 mg by mouth at bedtime. Indications: Urinary Incontinence      MAGNESIUM PO Take 1 Tablet by mouth at bedtime. Indications: supplement      Multiple Vitamins-Minerals (ZINC PO) Take 1 Tablet by mouth at bedtime. Indications: supplement      levothyroxine (SYNTHROID) 88 MCG Tab Take 88 mcg by mouth every morning on an empty stomach. Indications: Underactive Thyroid       No current facility-administered medications for this visit.       Allergies:  Allergies  "  Allergen Reactions    Bee Venom Swelling     anaphylaxis    Shellfish Allergy Swelling       Allergies, past medical history, past surgical history, family history, social history reviewed and updated.    Objective:    /54   Pulse 73   Temp 35.9 °C (96.7 °F) (Temporal)   Resp 14   Ht 1.626 m (5' 4\")   Wt 64.9 kg (143 lb 1.3 oz)   SpO2 95%   BMI 24.56 kg/m²    Body mass index is 24.56 kg/m².    Physical exam:  General: Normal appearance, no acute distress, not ill-appearing  HEENT: EOM intact, conjunctiva normal limits, negative right/left eye discharge.  Sclera anicteric  Cardiovascular: Normal rate and rhythm, no murmurs  Pulmonary: No respiratory distress, no wheezing, no rales, breath sounds normal.  Musculoskeletal: No edema bilaterally. Posture in the upper spine is kyphotic, slight rotated to the R curvature.  Skin: Warm, dry, no lesions  Neurological: No focal deficits, normal gait  Psychiatric: Mood within normal limits    Assessment/Plan:    Assessment & Plan  1. Upper back pain likely due to muscle tightness, exacerbated by poor posture from cerebral palsy - No numbness or tingling. CT C-spine and chest x-ray (Sept) normal.  - Advise heating pad, ibuprofen or Tylenol, stretching exercises, and physical therapy  - Order thoracic spine x-ray     2. Fall prevention - Continue physical therapy for strength and balance.  - Use walker or cane if needed  - Provide physical therapy referral if necessary    Follow-up in a few months       Problem List Items Addressed This Visit    None  Visit Diagnoses       Pain in thoracic spine        Relevant Orders    DX-THORACIC SPINE-2 VIEWS            Return if symptoms worsen or fail to improve.   "

## 2024-12-17 ENCOUNTER — APPOINTMENT (OUTPATIENT)
Dept: RADIOLOGY | Facility: IMAGING CENTER | Age: 79
End: 2024-12-17
Attending: STUDENT IN AN ORGANIZED HEALTH CARE EDUCATION/TRAINING PROGRAM
Payer: MEDICARE

## 2024-12-17 DIAGNOSIS — M54.6 PAIN IN THORACIC SPINE: ICD-10-CM

## 2024-12-17 PROCEDURE — 72070 X-RAY EXAM THORAC SPINE 2VWS: CPT | Mod: TC | Performed by: RADIOLOGY

## 2025-01-06 ENCOUNTER — TELEPHONE (OUTPATIENT)
Dept: MEDICAL GROUP | Facility: PHYSICIAN GROUP | Age: 80
End: 2025-01-06
Payer: MEDICARE

## 2025-01-06 NOTE — TELEPHONE ENCOUNTER
----- Message from Physician Georgia Cartagena D.O. sent at 1/2/2025  9:24 AM PST -----  Normal thoracic xr  Bharath Cartagena DO

## 2025-02-12 ENCOUNTER — OFFICE VISIT (OUTPATIENT)
Dept: MEDICAL GROUP | Facility: PHYSICIAN GROUP | Age: 80
End: 2025-02-12
Payer: MEDICARE

## 2025-02-12 VITALS
DIASTOLIC BLOOD PRESSURE: 54 MMHG | TEMPERATURE: 96.7 F | WEIGHT: 148.37 LBS | SYSTOLIC BLOOD PRESSURE: 116 MMHG | HEART RATE: 74 BPM | OXYGEN SATURATION: 95 % | BODY MASS INDEX: 25.33 KG/M2 | RESPIRATION RATE: 12 BRPM | HEIGHT: 64 IN

## 2025-02-12 DIAGNOSIS — G44.52 NEW DAILY PERSISTENT HEADACHE: ICD-10-CM

## 2025-02-12 DIAGNOSIS — I60.9 SAH (SUBARACHNOID HEMORRHAGE) (HCC): ICD-10-CM

## 2025-02-12 PROCEDURE — 99213 OFFICE O/P EST LOW 20 MIN: CPT | Performed by: STUDENT IN AN ORGANIZED HEALTH CARE EDUCATION/TRAINING PROGRAM

## 2025-02-12 PROCEDURE — 3074F SYST BP LT 130 MM HG: CPT | Performed by: STUDENT IN AN ORGANIZED HEALTH CARE EDUCATION/TRAINING PROGRAM

## 2025-02-12 PROCEDURE — 3078F DIAST BP <80 MM HG: CPT | Performed by: STUDENT IN AN ORGANIZED HEALTH CARE EDUCATION/TRAINING PROGRAM

## 2025-02-12 RX ORDER — LIOTHYRONINE SODIUM 5 UG/1
TABLET ORAL
COMMUNITY
Start: 2024-12-23

## 2025-02-12 RX ORDER — ERYTHROMYCIN 5 MG/G
OINTMENT OPHTHALMIC
COMMUNITY
Start: 2024-11-15

## 2025-02-12 RX ORDER — CEFUROXIME AXETIL 500 MG/1
500 TABLET ORAL 2 TIMES DAILY
COMMUNITY
Start: 2025-01-02

## 2025-02-12 ASSESSMENT — PATIENT HEALTH QUESTIONNAIRE - PHQ9: CLINICAL INTERPRETATION OF PHQ2 SCORE: 0

## 2025-02-12 NOTE — PROGRESS NOTES
Verbal Consent given for JAIDA recording software    HISTORY OF PRESENT ILLNESS: Letha is a pleasant 79 y.o. female, established patient who presents today to discuss medical problems as listed below:    History of Present Illness  79-year-old female presents for follow-up.    Persistent left leg swelling for over a month, attributed to lymphatic drainage issues. No right leg swelling. Previously received beneficial physical therapy at [facility]. Swelling exacerbated since last visit. Sought emergency care for redness and swelling; ultrasound ruled out DVT. Prescribed antibiotics for suspected bacterial infection. Elevating leg at night alleviates swelling.    New onset of dull headache in the back of her head daily, concerned it may be related to a fall in 09/2024 resulting in a brain bleed. She would like to consult with a neurologist about this issue.     Taking medication for bladder control, which has side effects.       Current Outpatient Medications Ordered in Epic   Medication Sig Dispense Refill    vibegron (GEMTESA) 75 MG tablet Take 75 mg by mouth every day.      liothyronine (CYTOMEL) 5 MCG Tab TAKE 0.5 TABLETS (2.5 MCG) BY MOUTH ONCE DAILY.      erythromycin 5 MG/GM Ointment APPLY 1CM RIBBON INTO THE LOWER CONJUNCTIVAL SAC IN BOTH EYES AT BEDTIME      Cholecalciferol (VITAMIN D3) 125 MCG (5000 UT) Cap Take 1 Tablet by mouth every day.      clobetasol (TEMOVATE) 0.05 % Ointment 30      Cyanocobalamin 3000 MCG/ML Liquid orally once a day      estradiol (ESTRACE) 0.1 MG/GM vaginal cream Insert 1 g into the vagina every Monday, Wednesday, and Friday. 42.5 g 5    multivitamin Tab Take 1 Tablet by mouth every day.      oxybutynin SR (DITROPAN-XL) 10 MG CR tablet Take 10 mg by mouth at bedtime. Indications: Urinary Incontinence      MAGNESIUM PO Take 1 Tablet by mouth at bedtime. Indications: supplement      Multiple Vitamins-Minerals (ZINC PO) Take 1 Tablet by mouth at bedtime.  Indications: supplement      levothyroxine (SYNTHROID) 88 MCG Tab Take 88 mcg by mouth every morning on an empty stomach. Indications: Underactive Thyroid      cefUROXime (CEFTIN) 500 MG Tab Take 500 mg by mouth 2 times a day. TAKE 1 TABLET BY MOUTH 2 TIMES A DAY FOR 5 DAYS (Patient not taking: Reported on 2/12/2025)       No current Epic-ordered facility-administered medications on file.       Review of systems:  Per HPI    Patient Active Problem List    Diagnosis Date Noted    Age-related osteoporosis without current pathological fracture 11/12/2024    Mild depression 11/12/2024    Urge incontinence of urine 10/09/2024    Other chest pain 10/09/2024    Syncope and collapse 09/03/2024    SAH (subarachnoid hemorrhage) ([facility]) 09/02/2024    Cerebral palsy ([facility]) 09/02/2024    Acquired hypothyroidism 09/02/2024     Past Surgical History:   Procedure Laterality Date    SHOULDER ARTHROPLASTY TOTAL REVERSED Right 9/20/2018    Procedure: RT SHOULDER ARTHROPLASTY TOTAL REVERSE;  Surgeon: Mariano Ballesteros M.D.;  Location: SURGERY [facility];  Service: Orthopedics    HIP ARTHROPLASTY TOTAL  10/1/2009    Performed by JAIME BRYANT at SURGERY [facility]    TENOTOMY  10/1/2009    Performed by JAIME BRYANT at SURGERY [facility]S    OTHER  1968    right foot tendon lengthening     TONSILLECTOMY  1956     Social History     Tobacco Use    Smoking status: Never    Smokeless tobacco: Never   Vaping Use    Vaping status: Never Used   Substance Use Topics    Alcohol use: No    Drug use: No      Family History   Problem Relation Age of Onset    Alzheimer's Disease Father      Current Outpatient Medications   Medication Sig Dispense Refill    vibegron (GEMTESA) 75 MG tablet Take 75 mg by mouth every day.      liothyronine (CYTOMEL) 5 MCG Tab TAKE 0.5 TABLETS (2.5 MCG) BY MOUTH ONCE DAILY.      erythromycin 5 MG/GM Ointment APPLY 1CM RIBBON INTO THE LOWER CONJUNCTIVAL SAC IN BOTH EYES AT BEDTIME       "Cholecalciferol (VITAMIN D3) 125 MCG (5000 UT) Cap Take 1 Tablet by mouth every day.      clobetasol (TEMOVATE) 0.05 % Ointment 30      Cyanocobalamin 3000 MCG/ML Liquid orally once a day      estradiol (ESTRACE) 0.1 MG/GM vaginal cream Insert 1 g into the vagina every Monday, Wednesday, and Friday. 42.5 g 5    multivitamin Tab Take 1 Tablet by mouth every day.      oxybutynin SR (DITROPAN-XL) 10 MG CR tablet Take 10 mg by mouth at bedtime. Indications: Urinary Incontinence      MAGNESIUM PO Take 1 Tablet by mouth at bedtime. Indications: supplement      Multiple Vitamins-Minerals (ZINC PO) Take 1 Tablet by mouth at bedtime. Indications: supplement      levothyroxine (SYNTHROID) 88 MCG Tab Take 88 mcg by mouth every morning on an empty stomach. Indications: Underactive Thyroid      cefUROXime (CEFTIN) 500 MG Tab Take 500 mg by mouth 2 times a day. TAKE 1 TABLET BY MOUTH 2 TIMES A DAY FOR 5 DAYS (Patient not taking: Reported on 2/12/2025)       No current facility-administered medications for this visit.       Allergies:  Allergies   Allergen Reactions    Bee Venom Swelling     anaphylaxis    Shellfish Allergy Swelling       Allergies, past medical history, past surgical history, family history, social history reviewed and updated.    Objective:    /54   Pulse 74   Temp 35.9 °C (96.7 °F) (Temporal)   Resp 12   Ht 1.626 m (5' 4\")   Wt 67.3 kg (148 lb 5.9 oz)   SpO2 95%   BMI 25.47 kg/m²    Body mass index is 25.47 kg/m².    Physical exam:  General: Normal appearance, no acute distress, not ill-appearing  HEENT: EOM intact, conjunctiva normal limits, negative right/left eye discharge.  Sclera anicteric  Cardiovascular: Normal rate and rhythm, no murmurs  Pulmonary: No respiratory distress, no wheezing, no rales, breath sounds normal.  Musculoskeletal: No edema bilaterally  Skin: Warm, dry, no lesions  Neurological: No focal deficits, normal gait  Psychiatric: Mood within normal " limits    Assessment/Plan:    Assessment & Plan  1. Left lower extremity edema: Likely due to venous stasis.   - DVT ruled out  - Use compression stockings.  - Elevate legs during sleep.  - If worsens, order ultrasound to evaluate for venous insufficiency.    2. Subarachnoid hemorrhage: History of subarachnoid hemorrhage post-trauma, not monitored until resolution. New daily dull headache at the back of the head.   - Repeat head CT scan to ensure resolution of previous hemorrhage.  - likely 2/2 to neck pain  - patient would like to consult with a neurologist about this headache     Follow-up  - Follow-up in 6 months to 1 year.       Problem List Items Addressed This Visit       SAH (subarachnoid hemorrhage) (HCC)    Relevant Orders    Referral to Neurology    CT-HEAD W/O     Other Visit Diagnoses         New daily persistent headache        Relevant Orders    Referral to Neurology    CT-HEAD W/O            Return in about 6 months (around 8/12/2025), or if symptoms worsen or fail to improve.

## 2025-08-13 ENCOUNTER — OFFICE VISIT (OUTPATIENT)
Dept: MEDICAL GROUP | Facility: PHYSICIAN GROUP | Age: 80
End: 2025-08-13
Payer: MEDICARE

## 2025-08-13 VITALS
SYSTOLIC BLOOD PRESSURE: 120 MMHG | DIASTOLIC BLOOD PRESSURE: 54 MMHG | HEART RATE: 77 BPM | WEIGHT: 143.74 LBS | OXYGEN SATURATION: 95 % | HEIGHT: 64 IN | RESPIRATION RATE: 12 BRPM | TEMPERATURE: 97.4 F | BODY MASS INDEX: 24.54 KG/M2

## 2025-08-13 DIAGNOSIS — Z23 NEED FOR VACCINATION: ICD-10-CM

## 2025-08-13 DIAGNOSIS — M79.89 RIGHT LEG SWELLING: Primary | ICD-10-CM

## 2025-08-13 PROBLEM — Z86.79 HISTORY OF SUBARACHNOID HEMORRHAGE: Status: ACTIVE | Noted: 2024-09-02

## 2025-08-13 PROBLEM — R55 SYNCOPE AND COLLAPSE: Status: RESOLVED | Noted: 2024-09-03 | Resolved: 2025-08-13

## 2025-08-13 PROCEDURE — 90471 IMMUNIZATION ADMIN: CPT | Performed by: STUDENT IN AN ORGANIZED HEALTH CARE EDUCATION/TRAINING PROGRAM

## 2025-08-13 PROCEDURE — 3074F SYST BP LT 130 MM HG: CPT | Performed by: STUDENT IN AN ORGANIZED HEALTH CARE EDUCATION/TRAINING PROGRAM

## 2025-08-13 PROCEDURE — 90715 TDAP VACCINE 7 YRS/> IM: CPT | Performed by: STUDENT IN AN ORGANIZED HEALTH CARE EDUCATION/TRAINING PROGRAM

## 2025-08-13 PROCEDURE — 99213 OFFICE O/P EST LOW 20 MIN: CPT | Mod: 25 | Performed by: STUDENT IN AN ORGANIZED HEALTH CARE EDUCATION/TRAINING PROGRAM

## 2025-08-13 PROCEDURE — 3078F DIAST BP <80 MM HG: CPT | Performed by: STUDENT IN AN ORGANIZED HEALTH CARE EDUCATION/TRAINING PROGRAM
